# Patient Record
Sex: FEMALE | Race: WHITE | NOT HISPANIC OR LATINO
[De-identification: names, ages, dates, MRNs, and addresses within clinical notes are randomized per-mention and may not be internally consistent; named-entity substitution may affect disease eponyms.]

---

## 2018-06-21 ENCOUNTER — APPOINTMENT (OUTPATIENT)
Dept: OBGYN | Facility: CLINIC | Age: 23
End: 2018-06-21

## 2019-12-08 ENCOUNTER — INPATIENT (INPATIENT)
Facility: HOSPITAL | Age: 24
LOS: 1 days | Discharge: HOME | End: 2019-12-10
Attending: HOSPITALIST | Admitting: HOSPITALIST
Payer: COMMERCIAL

## 2019-12-08 VITALS
SYSTOLIC BLOOD PRESSURE: 133 MMHG | TEMPERATURE: 102 F | DIASTOLIC BLOOD PRESSURE: 62 MMHG | HEART RATE: 111 BPM | OXYGEN SATURATION: 98 % | RESPIRATION RATE: 20 BRPM

## 2019-12-08 LAB
ALBUMIN SERPL ELPH-MCNC: 3.7 G/DL — SIGNIFICANT CHANGE UP (ref 3.5–5.2)
ALP SERPL-CCNC: 53 U/L — SIGNIFICANT CHANGE UP (ref 30–115)
ALT FLD-CCNC: 10 U/L — SIGNIFICANT CHANGE UP (ref 0–41)
ANION GAP SERPL CALC-SCNC: 19 MMOL/L — HIGH (ref 7–14)
APPEARANCE UR: ABNORMAL
APTT BLD: 26.5 SEC — LOW (ref 27–39.2)
AST SERPL-CCNC: 18 U/L — SIGNIFICANT CHANGE UP (ref 0–41)
BACTERIA # UR AUTO: ABNORMAL
BASOPHILS # BLD AUTO: 0.03 K/UL — SIGNIFICANT CHANGE UP (ref 0–0.2)
BASOPHILS NFR BLD AUTO: 0.1 % — SIGNIFICANT CHANGE UP (ref 0–1)
BILIRUB SERPL-MCNC: 0.3 MG/DL — SIGNIFICANT CHANGE UP (ref 0.2–1.2)
BILIRUB UR-MCNC: NEGATIVE — SIGNIFICANT CHANGE UP
BUN SERPL-MCNC: 10 MG/DL — SIGNIFICANT CHANGE UP (ref 10–20)
CALCIUM SERPL-MCNC: 8.9 MG/DL — SIGNIFICANT CHANGE UP (ref 8.5–10.1)
CHLORIDE SERPL-SCNC: 88 MMOL/L — LOW (ref 98–110)
CO2 SERPL-SCNC: 21 MMOL/L — SIGNIFICANT CHANGE UP (ref 17–32)
COLOR SPEC: SIGNIFICANT CHANGE UP
CREAT SERPL-MCNC: 0.9 MG/DL — SIGNIFICANT CHANGE UP (ref 0.7–1.5)
DIFF PNL FLD: ABNORMAL
EOSINOPHIL # BLD AUTO: 0 K/UL — SIGNIFICANT CHANGE UP (ref 0–0.7)
EOSINOPHIL NFR BLD AUTO: 0 % — SIGNIFICANT CHANGE UP (ref 0–8)
EPI CELLS # UR: 3 /HPF — SIGNIFICANT CHANGE UP (ref 0–5)
GLUCOSE SERPL-MCNC: 130 MG/DL — HIGH (ref 70–99)
GLUCOSE UR QL: NEGATIVE — SIGNIFICANT CHANGE UP
HCT VFR BLD CALC: 37.5 % — SIGNIFICANT CHANGE UP (ref 37–47)
HGB BLD-MCNC: 12.9 G/DL — SIGNIFICANT CHANGE UP (ref 12–16)
HYALINE CASTS # UR AUTO: 1 /LPF — SIGNIFICANT CHANGE UP (ref 0–7)
IMM GRANULOCYTES NFR BLD AUTO: 1 % — HIGH (ref 0.1–0.3)
INR BLD: 1.24 RATIO — SIGNIFICANT CHANGE UP (ref 0.65–1.3)
KETONES UR-MCNC: ABNORMAL
LEUKOCYTE ESTERASE UR-ACNC: ABNORMAL
LIDOCAIN IGE QN: 12 U/L — SIGNIFICANT CHANGE UP (ref 7–60)
LYMPHOCYTES # BLD AUTO: 0.87 K/UL — LOW (ref 1.2–3.4)
LYMPHOCYTES # BLD AUTO: 4 % — LOW (ref 20.5–51.1)
MCHC RBC-ENTMCNC: 29.5 PG — SIGNIFICANT CHANGE UP (ref 27–31)
MCHC RBC-ENTMCNC: 34.4 G/DL — SIGNIFICANT CHANGE UP (ref 32–37)
MCV RBC AUTO: 85.6 FL — SIGNIFICANT CHANGE UP (ref 81–99)
MONOCYTES # BLD AUTO: 2.62 K/UL — HIGH (ref 0.1–0.6)
MONOCYTES NFR BLD AUTO: 12 % — HIGH (ref 1.7–9.3)
NEUTROPHILS # BLD AUTO: 18.02 K/UL — HIGH (ref 1.4–6.5)
NEUTROPHILS NFR BLD AUTO: 82.9 % — HIGH (ref 42.2–75.2)
NITRITE UR-MCNC: NEGATIVE — SIGNIFICANT CHANGE UP
NRBC # BLD: 0 /100 WBCS — SIGNIFICANT CHANGE UP (ref 0–0)
PH UR: 6.5 — SIGNIFICANT CHANGE UP (ref 5–8)
PLATELET # BLD AUTO: 236 K/UL — SIGNIFICANT CHANGE UP (ref 130–400)
POTASSIUM SERPL-MCNC: 3.7 MMOL/L — SIGNIFICANT CHANGE UP (ref 3.5–5)
POTASSIUM SERPL-SCNC: 3.7 MMOL/L — SIGNIFICANT CHANGE UP (ref 3.5–5)
PROT SERPL-MCNC: 7.3 G/DL — SIGNIFICANT CHANGE UP (ref 6–8)
PROT UR-MCNC: ABNORMAL
PROTHROM AB SERPL-ACNC: 14.2 SEC — HIGH (ref 9.95–12.87)
RBC # BLD: 4.38 M/UL — SIGNIFICANT CHANGE UP (ref 4.2–5.4)
RBC # FLD: 11.9 % — SIGNIFICANT CHANGE UP (ref 11.5–14.5)
RBC CASTS # UR COMP ASSIST: 3 /HPF — SIGNIFICANT CHANGE UP (ref 0–4)
SODIUM SERPL-SCNC: 128 MMOL/L — LOW (ref 135–146)
SP GR SPEC: 1.01 — LOW (ref 1.01–1.02)
UROBILINOGEN FLD QL: SIGNIFICANT CHANGE UP
WBC # BLD: 21.75 K/UL — HIGH (ref 4.8–10.8)
WBC # FLD AUTO: 21.75 K/UL — HIGH (ref 4.8–10.8)
WBC UR QL: 23 /HPF — HIGH (ref 0–5)

## 2019-12-08 PROCEDURE — 99221 1ST HOSP IP/OBS SF/LOW 40: CPT

## 2019-12-08 PROCEDURE — 71250 CT THORAX DX C-: CPT | Mod: 26

## 2019-12-08 PROCEDURE — 74220 X-RAY XM ESOPHAGUS 1CNTRST: CPT | Mod: 26

## 2019-12-08 PROCEDURE — 74177 CT ABD & PELVIS W/CONTRAST: CPT | Mod: 26

## 2019-12-08 PROCEDURE — 76770 US EXAM ABDO BACK WALL COMP: CPT | Mod: 26

## 2019-12-08 PROCEDURE — 99291 CRITICAL CARE FIRST HOUR: CPT

## 2019-12-08 RX ORDER — SODIUM CHLORIDE 9 MG/ML
1000 INJECTION INTRAMUSCULAR; INTRAVENOUS; SUBCUTANEOUS ONCE
Refills: 0 | Status: COMPLETED | OUTPATIENT
Start: 2019-12-08 | End: 2019-12-08

## 2019-12-08 RX ORDER — PANTOPRAZOLE SODIUM 20 MG/1
40 TABLET, DELAYED RELEASE ORAL
Refills: 0 | Status: DISCONTINUED | OUTPATIENT
Start: 2019-12-08 | End: 2019-12-10

## 2019-12-08 RX ORDER — MEROPENEM 1 G/30ML
1000 INJECTION INTRAVENOUS EVERY 8 HOURS
Refills: 0 | Status: DISCONTINUED | OUTPATIENT
Start: 2019-12-08 | End: 2019-12-10

## 2019-12-08 RX ORDER — SODIUM CHLORIDE 9 MG/ML
1000 INJECTION, SOLUTION INTRAVENOUS
Refills: 0 | Status: DISCONTINUED | OUTPATIENT
Start: 2019-12-08 | End: 2019-12-09

## 2019-12-08 RX ORDER — ONDANSETRON 8 MG/1
4 TABLET, FILM COATED ORAL EVERY 8 HOURS
Refills: 0 | Status: DISCONTINUED | OUTPATIENT
Start: 2019-12-08 | End: 2019-12-09

## 2019-12-08 RX ORDER — ONDANSETRON 8 MG/1
4 TABLET, FILM COATED ORAL ONCE
Refills: 0 | Status: COMPLETED | OUTPATIENT
Start: 2019-12-08 | End: 2019-12-08

## 2019-12-08 RX ORDER — ACETAMINOPHEN 500 MG
650 TABLET ORAL ONCE
Refills: 0 | Status: COMPLETED | OUTPATIENT
Start: 2019-12-08 | End: 2019-12-08

## 2019-12-08 RX ADMIN — SODIUM CHLORIDE 1000 MILLILITER(S): 9 INJECTION INTRAMUSCULAR; INTRAVENOUS; SUBCUTANEOUS at 11:45

## 2019-12-08 RX ADMIN — SODIUM CHLORIDE 1000 MILLILITER(S): 9 INJECTION INTRAMUSCULAR; INTRAVENOUS; SUBCUTANEOUS at 12:44

## 2019-12-08 RX ADMIN — SODIUM CHLORIDE 1000 MILLILITER(S): 9 INJECTION INTRAMUSCULAR; INTRAVENOUS; SUBCUTANEOUS at 13:30

## 2019-12-08 RX ADMIN — ONDANSETRON 4 MILLIGRAM(S): 8 TABLET, FILM COATED ORAL at 21:28

## 2019-12-08 RX ADMIN — ONDANSETRON 4 MILLIGRAM(S): 8 TABLET, FILM COATED ORAL at 11:44

## 2019-12-08 RX ADMIN — MEROPENEM 100 MILLIGRAM(S): 1 INJECTION INTRAVENOUS at 23:14

## 2019-12-08 RX ADMIN — Medication 650 MILLIGRAM(S): at 01:41

## 2019-12-08 RX ADMIN — SODIUM CHLORIDE 100 MILLILITER(S): 9 INJECTION, SOLUTION INTRAVENOUS at 23:15

## 2019-12-08 RX ADMIN — Medication 650 MILLIGRAM(S): at 18:12

## 2019-12-08 RX ADMIN — PANTOPRAZOLE SODIUM 40 MILLIGRAM(S): 20 TABLET, DELAYED RELEASE ORAL at 23:13

## 2019-12-08 RX ADMIN — Medication 650 MILLIGRAM(S): at 12:44

## 2019-12-08 NOTE — H&P ADULT - NSHPPHYSICALEXAM_GEN_ALL_CORE
PHYSICAL EXAM:  GENERAL: NAD, well-developed  HEAD:  Atraumatic, Normocephalic  EYES: EOMI, PERRLA, conjunctiva and sclera clear  NECK: Supple, No JVD  CHEST/LUNG: Clear to auscultation bilaterally; No wheeze  HEART: Regular rate and rhythm; No murmurs, rubs, or gallops  ABDOMEN: Left sided Pyelonephritis   EXTREMITIES:  2+ Peripheral Pulses, No clubbing, cyanosis, or edema  PSYCH: AAOx3  NEUROLOGY: non-focal  SKIN: No rashes or lesions PHYSICAL EXAM:  GENERAL: Left sided abdominal pain  HEAD:  Atraumatic, Normocephalic  EYES: EOMI, PERRLA, conjunctiva and sclera clear  NECK: Supple, No JVD  CHEST/LUNG: b/l decreased air entry  HEART: Regular rate and rhythm; No murmurs, rubs, or gallops  ABDOMEN: Left sided pain  EXTREMITIES:  2+ Peripheral Pulses, No clubbing, cyanosis, or edema  PSYCH: AAOx3  NEUROLOGY: non-focal  SKIN: No rashes or lesions

## 2019-12-08 NOTE — H&P ADULT - ASSESSMENT
24 year old female, no significant PMH , comes in with complaint of abdominal pain, dull, non radiating, located in LLQ, no vaginal bleeding, patient last period was Nov 30th, no recent travel, no hemoptysis. Pt endorses pain along with urinary symptoms , on presentation to ED , pt was meeting sepsis criterion and received 2 litres NS along with Levaquin. Ct scan consistent with Left sided Pyelonephritis along with Pneumomediastinum secondary to Vomiting.    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 Pt endorses pain along with urinary symptoms , on presentation to ED , pt was meeting sepsis criterion and received 2 litres NS along with Levaquin. Ct scan consistent with Left sided Pyelonephritis along with Pneumomediastinum secondary to Vomiting.    #) Sepsis secondary to Lt sided Pyelonephritis  - admit to medicine  - f/u U/C , f/U blood culture, F/U lactate  - IV meropenum for now, de escelate per sensitivities  - IV LR @ 100 , maintain MAP 65 , bolus PRN   - KUB US   - pain and fever control      #) Pneumomediastinum probably secondary to Microperforation   - NPO for now  - IV protonix  - zofran PRN  - Barium swallow negative  - CT surgery f/u  - ICU made aware of pt     #) Diet strict NPO    #) DVT PPX , pt mobile     Dispo Acute

## 2019-12-08 NOTE — H&P ADULT - HISTORY OF PRESENT ILLNESS
24 year old female, no significant PMH , comes in with complaint of abdominal pain, dull, non radiating, located in LLQ, no vaginal bleeding, patient last period was Nov 30th, no recent travel, no hemoptysis. Pt endorses pain along with urinary symptoms , on presentation to ED , pt was meeting sepsis criterion and received 2 litres NS along with Levaquin. Ct scan consistent with Left sided Pyelonephritis along with Pneumomediastinum secondary to Vomiting.

## 2019-12-08 NOTE — ED PROVIDER NOTE - CLINICAL SUMMARY MEDICAL DECISION MAKING FREE TEXT BOX
I have personally performed a history and physical exam on this patient and personally directed the management of the patient. Patient evaluated for abdominal pain, found to have + pyelonephritis and + Pneumodeistinum, patient evaluated by surgery, barium swallow obtained, patient has no leak, admitted to medicine, surgery on board

## 2019-12-08 NOTE — H&P ADULT - NSHPLABSRESULTS_GEN_ALL_CORE
CBC Full  -  ( 08 Dec 2019 10:48 )  WBC Count : 21.75 K/uL  RBC Count : 4.38 M/uL  Hemoglobin : 12.9 g/dL  Hematocrit : 37.5 %  Platelet Count - Automated : 236 K/uL  Mean Cell Volume : 85.6 fL  Mean Cell Hemoglobin : 29.5 pg  Mean Cell Hemoglobin Concentration : 34.4 g/dL  Auto Neutrophil # : 18.02 K/uL  Auto Lymphocyte # : 0.87 K/uL  Auto Monocyte # : 2.62 K/uL  Auto Eosinophil # : 0.00 K/uL  Auto Basophil # : 0.03 K/uL  Auto Neutrophil % : 82.9 %  Auto Lymphocyte % : 4.0 %  Auto Monocyte % : 12.0 %  Auto Eosinophil % : 0.0 %  Auto Basophil % : 0.1 %    12-08    128<L>  |  88<L>  |  10  ----------------------------<  130<H>  3.7   |  21  |  0.9    Ca    8.9      08 Dec 2019 10:48    TPro  7.3  /  Alb  3.7  /  TBili  0.3  /  DBili  x   /  AST  18  /  ALT  10  /  AlkPhos  53  12-08    CT abdomen     1. Findings are compatible with acute left-sided pyelonephritis.    2. Partially imaged pneumomediastinum.

## 2019-12-08 NOTE — CONSULT NOTE ADULT - SUBJECTIVE AND OBJECTIVE BOX
RICHI CHIANG [id number]    HPI: 25 y/o female diagnosed with pyelonephritis c/o fever and vomiting for a few days found to have pneumomediastinum on ct scan.       PAST MEDICAL & SURGICAL HISTORY:  No pertinent past medical history  No significant past surgical history        MEDICATIONS  (STANDING):    MEDICATIONS  (PRN): inhaler (uses it sometimes / has it saved from her bronchitis last year )      Allergies    No Known Allergies    Intolerances        REVIEW OF SYSTEMS    [x ] A ten-point review of systems was otherwise negative except as noted.  [ ] Due to altered mental status/intubation, subjective information were not able to be obtained from the patient. History was obtained, to the extent possible, from review of the chart and collateral sources of information.      Vital Signs Last 24 Hrs  T(C): 38.3 (08 Dec 2019 15:57), Max: 39.3 (08 Dec 2019 13:35)  T(F): 100.9 (08 Dec 2019 15:57), Max: 102.7 (08 Dec 2019 13:35)  HR: 109 (08 Dec 2019 15:57) (98 - 111)  BP: 110/56 (08 Dec 2019 15:57) (108/52 - 133/62)  BP(mean): --  RR: 16 (08 Dec 2019 15:57) (16 - 20)  SpO2: 99% (08 Dec 2019 15:57) (98% - 99%)    PHYSICAL EXAM:  GENERAL: NAD, well-appearing  CHEST/LUNG: Clear to auscultation bilaterally  HEART: Regular rate and rhythm  ABDOMEN: Soft, Nontender, Nondistended;   EXTREMITIES:  No clubbing, cyanosis, or edema        Labs:  CAPILLARY BLOOD GLUCOSE                              12.9   21.75 )-----------( 236      ( 08 Dec 2019 10:48 )             37.5       Auto Neutrophil %: 82.9 % (19 @ 10:48)  Auto Immature Granulocyte %: 1.0 % (19 @ 10:48)        128<L>  |  88<L>  |  10  ----------------------------<  130<H>  3.7   |  21  |  0.9      Calcium, Total Serum: 8.9 mg/dL (19 @ 10:48)      LFTs:             7.3  | 0.3  | 18       ------------------[53      ( 08 Dec 2019 10:48 )  3.7  | x    | 10          Lipase:12     Amylase:x             Coags:     14.20  ----< 1.24    ( 08 Dec 2019 10:48 )     26.5                Urinalysis Basic - ( 08 Dec 2019 11:10 )    Color: Light Yellow / Appearance: Slightly Turbid / S.009 / pH: x  Gluc: x / Ketone: Small  / Bili: Negative / Urobili: <2 mg/dL   Blood: x / Protein: 30 mg/dL / Nitrite: Negative   Leuk Esterase: Moderate / RBC: 3 /HPF / WBC 23 /HPF   Sq Epi: x / Non Sq Epi: 3 /HPF / Bacteria: Few            RADIOLOGY & ADDITIONAL STUDIES:  < from: CT Chest No Cont (19 @ 14:17) >  Extensive pneumomediastinum as detailed above. No pneumothorax.  < from: CT Abdomen and Pelvis w/ IV Cont (19 @ 13:09) >  1. Findings are compatible with acute left-sided pyelonephritis.    2. Partially imaged pneumomediastinum.    < end of copied text > RICHI CHIANG [id number]    HPI: 23 y/o female diagnosed with pyelonephritis c/o fever and vomiting for a few days found to have pneumomediastinum on ct scan.       PAST MEDICAL & SURGICAL HISTORY:  No pertinent past medical history  No significant past surgical history        MEDICATIONS  (STANDING):    MEDICATIONS  (PRN): inhaler (uses it sometimes / has it saved from her bronchitis last year )      Allergies    No Known Allergies    Intolerances        REVIEW OF SYSTEMS    [x ] A ten-point review of systems was otherwise negative except as noted.  [ ] Due to altered mental status/intubation, subjective information were not able to be obtained from the patient. History was obtained, to the extent possible, from review of the chart and collateral sources of information.      Vital Signs Last 24 Hrs  T(C): 38.3 (08 Dec 2019 15:57), Max: 39.3 (08 Dec 2019 13:35)  T(F): 100.9 (08 Dec 2019 15:57), Max: 102.7 (08 Dec 2019 13:35)  HR: 109 (08 Dec 2019 15:57) (98 - 111)  BP: 110/56 (08 Dec 2019 15:57) (108/52 - 133/62)  BP(mean): --  RR: 16 (08 Dec 2019 15:57) (16 - 20)  SpO2: 99% (08 Dec 2019 15:57) (98% - 99%)    PHYSICAL EXAM:  GENERAL: NAD, well-appearing  CHEST/LUNG: Clear to auscultation bilaterally  HEART: Regular rate and rhythm  ABDOMEN: Soft, Nontender, Nondistended;   EXTREMITIES:  No clubbing, cyanosis, or edema        Labs:  CAPILLARY BLOOD GLUCOSE                              12.9   21.75 )-----------( 236      ( 08 Dec 2019 10:48 )             37.5       Auto Neutrophil %: 82.9 % (19 @ 10:48)  Auto Immature Granulocyte %: 1.0 % (19 @ 10:48)        128<L>  |  88<L>  |  10  ----------------------------<  130<H>  3.7   |  21  |  0.9      Calcium, Total Serum: 8.9 mg/dL (19 @ 10:48)      LFTs:             7.3  | 0.3  | 18       ------------------[53      ( 08 Dec 2019 10:48 )  3.7  | x    | 10          Lipase:12     Amylase:x             Coags:     14.20  ----< 1.24    ( 08 Dec 2019 10:48 )     26.5                Urinalysis Basic - ( 08 Dec 2019 11:10 )    Color: Light Yellow / Appearance: Slightly Turbid / S.009 / pH: x  Gluc: x / Ketone: Small  / Bili: Negative / Urobili: <2 mg/dL   Blood: x / Protein: 30 mg/dL / Nitrite: Negative   Leuk Esterase: Moderate / RBC: 3 /HPF / WBC 23 /HPF   Sq Epi: x / Non Sq Epi: 3 /HPF / Bacteria: Few            RADIOLOGY & ADDITIONAL STUDIES:  < from: CT Chest No Cont (19 @ 14:17) >  Extensive pneumomediastinum as detailed above. No pneumothorax.  < from: CT Abdomen and Pelvis w/ IV Cont (19 @ 13:09) >  1. Findings are compatible with acute left-sided pyelonephritis.    2. Partially imaged pneumomediastinum.    < end of copied text >    < from: Xray Esophagram (19 @ 17:03) >  Impression  No evidence of esophageal leak.    < end of copied text >

## 2019-12-08 NOTE — ED PROVIDER NOTE - PHYSICAL EXAMINATION
CONSTITUTIONAL: Well-developed; well-nourished; in no acute distress. Sitting up and providing appropriate history and physical examination  SKIN: skin exam is warm and dry, no acute rash.  HEAD: Normocephalic; atraumatic.  EYES: PERRL, 3 mm bilateral, no nystagmus, EOM intact; conjunctiva and sclera clear.  ENT: + DRY MM, No nasal discharge; airway clear.  NECK: Supple; non tender. + full passive ROM in all directions. No JVD  CARD: S1, S2 normal; no murmurs, gallops, or rubs. Regular rate and rhythm. + Symmetric Strong Pulses  RESP: No wheezes, rales or rhonchi. Good air movement bilaterally  ABD: soft; non-distended; + LLQ tender. No Rebound, No Guarding, No signs of peritonitis, No CVA tenderness. No pulsatile abdominal mass. + Strong and Symmetric Pulses  EXT: Normal ROM. No clubbing, cyanosis or edema. Dp and Pt Pulses intact. Cap refill less than 3 seconds  NEURO: CN 2-12 intact, normal finger to nose, normal romberg, stable gait, no sensory or motor deficits, Alert, oriented, grossly unremarkable. No Focal deficits. GCS 15. NIH 0  PSYCH: Cooperative, appropriate.

## 2019-12-08 NOTE — ED ADULT NURSE REASSESSMENT NOTE - NS ED NURSE REASSESS COMMENT FT1
received pt from previous RN; pt aox4, pt endorses she feels nausea given prn Zofran and ivf as per order. vss.  will continue to monitor/ assess

## 2019-12-08 NOTE — ED PROVIDER NOTE - OBJECTIVE STATEMENT
24 year old female, no sig pmhx, comes in with complaint of abdominal pain, dull, non radiating, located in LLQ, no vaginal bleeding, patient last period Nov 30th, no recent travel, no hemoptysis, + multiple episode of nb nb vomiting

## 2019-12-08 NOTE — H&P ADULT - ATTENDING COMMENTS
Patient seen and examined independently. I agree with the resident's note, physical exam, and plan except as below.  Vital Signs Last 24 Hrs  T(C): 37.9 (09 Dec 2019 07:39), Max: 39.3 (08 Dec 2019 13:35)  T(F): 100.3 (09 Dec 2019 07:39), Max: 102.7 (08 Dec 2019 13:35)  HR: 87 (09 Dec 2019 07:39) (87 - 109)  BP: 115/85 (09 Dec 2019 07:39) (106/57 - 125/55)  BP(mean): --  RR: 18 (09 Dec 2019 07:39) (16 - 18)  SpO2: 98% (09 Dec 2019 07:39) (98% - 99%)  PE  nad  aaox3  ctabl  [id number]  soft ntnd+bs  no cvat  no cce  ambulating well     #sepsis due to pyleo - started empricially on gasper - follow ucx, bcx, - cont IVF  #pneumomediastinum - possibly due to wretching - esophageal study shows no leak - being followed by CTS  no intervention at this time - can advance to clears diet    follow daily CXR, antiemetics

## 2019-12-08 NOTE — CONSULT NOTE ADULT - ASSESSMENT
23 y/o female with pneumomediastinum likely secondary to vomiting   - f/u barium swallow 25 y/o female with pneumomediastinum likely secondary to vomiting   - f/u barium swallow       Senior Resident Note  25 YO presents with multiple episodes of vomiting and lower abd discomfort, pt denies any chest pain, SOB or any other symptoms. CT Chest/Abd/pel show acute LT pyelonephritis and extensive pneumomediastinum. barium swallow performed shows no leak. WBC 21  Pt seen and examined, abd soft, NT, ND  NPO and IVF for now, will continue to follow  Above note has been reviewed and edited  Plan d/w patient and Dr. Herrera

## 2019-12-09 LAB
ANION GAP SERPL CALC-SCNC: 17 MMOL/L — HIGH (ref 7–14)
BUN SERPL-MCNC: 7 MG/DL — LOW (ref 10–20)
CALCIUM SERPL-MCNC: 8 MG/DL — LOW (ref 8.5–10.1)
CHLORIDE SERPL-SCNC: 98 MMOL/L — SIGNIFICANT CHANGE UP (ref 98–110)
CO2 SERPL-SCNC: 19 MMOL/L — SIGNIFICANT CHANGE UP (ref 17–32)
CREAT SERPL-MCNC: 0.7 MG/DL — SIGNIFICANT CHANGE UP (ref 0.7–1.5)
GLUCOSE SERPL-MCNC: 110 MG/DL — HIGH (ref 70–99)
HCT VFR BLD CALC: 32.9 % — LOW (ref 37–47)
HGB BLD-MCNC: 11.3 G/DL — LOW (ref 12–16)
MCHC RBC-ENTMCNC: 29.3 PG — SIGNIFICANT CHANGE UP (ref 27–31)
MCHC RBC-ENTMCNC: 34.3 G/DL — SIGNIFICANT CHANGE UP (ref 32–37)
MCV RBC AUTO: 85.2 FL — SIGNIFICANT CHANGE UP (ref 81–99)
NRBC # BLD: 0 /100 WBCS — SIGNIFICANT CHANGE UP (ref 0–0)
PLATELET # BLD AUTO: 233 K/UL — SIGNIFICANT CHANGE UP (ref 130–400)
POTASSIUM SERPL-MCNC: 3.1 MMOL/L — LOW (ref 3.5–5)
POTASSIUM SERPL-SCNC: 3.1 MMOL/L — LOW (ref 3.5–5)
RBC # BLD: 3.86 M/UL — LOW (ref 4.2–5.4)
RBC # FLD: 12.3 % — SIGNIFICANT CHANGE UP (ref 11.5–14.5)
SODIUM SERPL-SCNC: 134 MMOL/L — LOW (ref 135–146)
WBC # BLD: 16.78 K/UL — HIGH (ref 4.8–10.8)
WBC # FLD AUTO: 16.78 K/UL — HIGH (ref 4.8–10.8)

## 2019-12-09 PROCEDURE — 99232 SBSQ HOSP IP/OBS MODERATE 35: CPT

## 2019-12-09 PROCEDURE — 71046 X-RAY EXAM CHEST 2 VIEWS: CPT | Mod: 26

## 2019-12-09 PROCEDURE — 74018 RADEX ABDOMEN 1 VIEW: CPT | Mod: 26

## 2019-12-09 PROCEDURE — 99223 1ST HOSP IP/OBS HIGH 75: CPT | Mod: AI

## 2019-12-09 RX ORDER — ONDANSETRON 8 MG/1
4 TABLET, FILM COATED ORAL ONCE
Refills: 0 | Status: COMPLETED | OUTPATIENT
Start: 2019-12-09 | End: 2019-12-09

## 2019-12-09 RX ORDER — INFLUENZA VIRUS VACCINE 15; 15; 15; 15 UG/.5ML; UG/.5ML; UG/.5ML; UG/.5ML
0.5 SUSPENSION INTRAMUSCULAR ONCE
Refills: 0 | Status: COMPLETED | OUTPATIENT
Start: 2019-12-09 | End: 2019-12-10

## 2019-12-09 RX ORDER — LANOLIN ALCOHOL/MO/W.PET/CERES
5 CREAM (GRAM) TOPICAL AT BEDTIME
Refills: 0 | Status: DISCONTINUED | OUTPATIENT
Start: 2019-12-09 | End: 2019-12-10

## 2019-12-09 RX ORDER — POTASSIUM CHLORIDE 20 MEQ
20 PACKET (EA) ORAL
Refills: 0 | Status: COMPLETED | OUTPATIENT
Start: 2019-12-09 | End: 2019-12-09

## 2019-12-09 RX ORDER — ONDANSETRON 8 MG/1
4 TABLET, FILM COATED ORAL EVERY 6 HOURS
Refills: 0 | Status: DISCONTINUED | OUTPATIENT
Start: 2019-12-09 | End: 2019-12-10

## 2019-12-09 RX ADMIN — PANTOPRAZOLE SODIUM 40 MILLIGRAM(S): 20 TABLET, DELAYED RELEASE ORAL at 07:07

## 2019-12-09 RX ADMIN — ONDANSETRON 4 MILLIGRAM(S): 8 TABLET, FILM COATED ORAL at 02:00

## 2019-12-09 RX ADMIN — ONDANSETRON 4 MILLIGRAM(S): 8 TABLET, FILM COATED ORAL at 20:02

## 2019-12-09 RX ADMIN — Medication 5 MILLIGRAM(S): at 21:30

## 2019-12-09 RX ADMIN — MEROPENEM 100 MILLIGRAM(S): 1 INJECTION INTRAVENOUS at 21:29

## 2019-12-09 RX ADMIN — PANTOPRAZOLE SODIUM 40 MILLIGRAM(S): 20 TABLET, DELAYED RELEASE ORAL at 17:49

## 2019-12-09 RX ADMIN — ONDANSETRON 4 MILLIGRAM(S): 8 TABLET, FILM COATED ORAL at 21:35

## 2019-12-09 RX ADMIN — Medication 50 MILLIEQUIVALENT(S): at 13:46

## 2019-12-09 RX ADMIN — ONDANSETRON 4 MILLIGRAM(S): 8 TABLET, FILM COATED ORAL at 13:51

## 2019-12-09 RX ADMIN — ONDANSETRON 4 MILLIGRAM(S): 8 TABLET, FILM COATED ORAL at 08:31

## 2019-12-09 RX ADMIN — Medication 50 MILLIEQUIVALENT(S): at 12:24

## 2019-12-09 RX ADMIN — MEROPENEM 100 MILLIGRAM(S): 1 INJECTION INTRAVENOUS at 07:27

## 2019-12-09 RX ADMIN — MEROPENEM 100 MILLIGRAM(S): 1 INJECTION INTRAVENOUS at 13:46

## 2019-12-09 NOTE — PROGRESS NOTE ADULT - ASSESSMENT
Ms. Murray is a 24 year old female, no significant PMH , comes in with complaint of abdominal pain, dull, non radiating, located in LLQ.    #) Sepsis secondary to Left sided Pyelonephritis  - First urinary tract infection as per patient, no previous ab use  - f/u U/C , f/U blood culture  - IV meropenum for now (as also possible esophageal perforation)  - Kidney US: no evidence of hydronephrosis  - CT scan showing radiologic evidence of left sided pyelonephritis  - pain and fever control    #) Pneumomediastinum probably secondary to Microperforation   - CT surgery following.   - IV protonix  - zofran PRN  - Water soluble swallow test: negative  - Clear liquid diet  - Repeat Chest xray     #) Hypokalemia  - Potassium repletion    #) Hypovolemic Hyponatremia  - secondary to profuse vomiting  - Improving

## 2019-12-09 NOTE — CONSULT NOTE ADULT - SUBJECTIVE AND OBJECTIVE BOX
Patient is a 24y old  Female who presents with a chief complaint of suspected pyelonephritis and Pneumomediastinum (09 Dec 2019 11:07)      HPI:  24 year old female, no significant PMH , comes in with complaint of abdominal pain, dull, non radiating, located in LLQ, no vaginal bleeding, patient last period was , no recent travel, no hemoptysis. Pt endorses dysuria, increased urinary frequency but denies hematuria .,Reports NBNB vomiting x 4 days , progressively worsening. unable to eat anything. Denies hemoptysis .No h/o recent trauma or fall  Reports OCP use . Denies marijuana, cocaine, IVDU . Denies vaping   In ED patient received litres NS along with IV  Levaquin.   Ct scan consistent with Left sided Pyelonephritis .Also seen Pneumomediastinum .ICU and CT surgery consulted overnight for evaluation of pneumomediastinum        PAST MEDICAL & SURGICAL HISTORY:  No pertinent past medical history  No significant past surgical history      SOCIAL HX:   Smoking    denies                     ETOH       denies                     Other denies IVDU or Marijuana use    FAMILY HISTORY:  :  No known cardiovacular family hisotry     Review Of Systems:     CONSTITUTIONAL:   no fever   no chills.  no weight gain   no weight loss    EYES:   no discharge,   no pain  no redness,   no visual changes.    ENT:   Ears: no ear pain and no hearing problems.  Nose: no nasal congestion and no nasal drainage.  Mouth/Throat: no dysphagia,  no hoarseness and no throat pain.  Neck: no lumps, no pain, no stiffness and no swollen glands.     CARDIOVASCULAR:   no chest pain,   no swelling  no palpitations  no syncope    RESPIRATORY:  no SOB,  no wheezing ,  no respiratory difficulty  no sputum production    GASTROINTESTINAL:   + abdominal pain,   no constipation,   no diarrhea,   + vomiting.    GENITOURINARY:  + dysuria,   + frequency,   + urgency  no hematuria.    MUSCULOSKELETAL:   no back pain,   no musculoskeletal pain,  no weakness.    SKIN:   no jaundice,   no lesions,   no pruritis,   no rashes.    NEURO:   no loss of consciousness,   no gait abnormality,   no headache,   no sensory deficits,   no weakness.    PSYCHIATRIC:   no known mental health issues  no anxiety  no depression    ALLERGIC/IMMUNOLOGIC:   No active allergic or immunologic issues      Allergies    No Known Allergies    Intolerances          PHYSICAL EXAM    ICU Vital Signs Last 24 Hrs  T(C): 37.9 (09 Dec 2019 07:39), Max: 39.3 (08 Dec 2019 13:35)  T(F): 100.3 (09 Dec 2019 07:39), Max: 102.7 (08 Dec 2019 13:35)  HR: 87 (09 Dec 2019 07:39) (87 - 109)  BP: 115/85 (09 Dec 2019 07:39) (106/57 - 125/55)  BP(mean): --  ABP: --  ABP(mean): --  RR: 18 (09 Dec 2019 07:39) (16 - 18)  SpO2: 98% (09 Dec 2019 07:39) (98% - 99%)      CONSTITUTIONAL:   Ill appearing.  Well nourished.  NAD    ENT:   Airway patent,   Mouth with normal mucosa.   No thrush    EYES:   pupils equal,   round and reactive to light.    CARDIAC:   Normal rate,   Regular rhythm.    Heart sounds S1, S2.   No edema      Vascular:   normal systolic impulse  no bruits    RESPIRATORY:   b/l BS  No wheezing   Normal chest expansion  No use of accessory muscles    GASTROINTESTINAL:  Abdomen soft   Non-tender,   No guarding,   + BS    GENITOURINARY  normal genitalia for sex  no edema    MUSCULOSKELETAL:   Range of motion is not limited,  no clubbing, cyanosis    NEUROLOGICAL:   Alert and oriented   No motor or sensory deficits.  Pertinent DTRs normal    SKIN:   Skin normal color for race,   Warm and dry  No evidence of rash.    PSYCHIATRIC:   Normal mood and affect.   No apparent risk to self or others.    HEME LYMPH:   No splenomegaly.  No cervical  lymphadenopathy.  No inguinal lymphadenopathy              LABS:                          11.3   16.78 )-----------( 233      ( 09 Dec 2019 05:21 )             32.9                                               12-    134<L>  |  98  |  7<L>  ----------------------------<  110<H>  3.1<L>   |  19  |  0.7    Ca    8.0<L>      09 Dec 2019 05:21    TPro  7.3  /  Alb  3.7  /  TBili  0.3  /  DBili  x   /  AST  18  /  ALT  10  /  AlkPhos  53  12-08      PT/INR - ( 08 Dec 2019 10:48 )   PT: 14.20 sec;   INR: 1.24 ratio         PTT - ( 08 Dec 2019 10:48 )  PTT:26.5 sec                                       Urinalysis Basic - ( 08 Dec 2019 11:10 )    Color: Light Yellow / Appearance: Slightly Turbid / S.009 / pH: x  Gluc: x / Ketone: Small  / Bili: Negative / Urobili: <2 mg/dL   Blood: x / Protein: 30 mg/dL / Nitrite: Negative   Leuk Esterase: Moderate / RBC: 3 /HPF / WBC 23 /HPF   Sq Epi: x / Non Sq Epi: 3 /HPF / Bacteria: Few                                                  LIVER FUNCTIONS - ( 08 Dec 2019 10:48 )  Alb: 3.7 g/dL / Pro: 7.3 g/dL / ALK PHOS: 53 U/L / ALT: 10 U/L / AST: 18 U/L / GGT: x                                                                                                                                       X-Rays reviewed:             < from: Xray Chest 2 Views PA/Lat (19 @ 09:42) >    No radiographic evidence for pneumomediastinum.    No pneumothorax.      < end of copied text >                                                              < from: Xray Esophagram (19 @ 17:03) >  This study was performed with water-soluble contrast given by mouth under   fluoroscopic monitoring. X-raytechnician Ally was observed in the exam   and radiology resident ezequiel was assisting the exam.  There is no impediment to the flow of contrast through the esophagus into   the stomach.  No contrast extravasation is seen.  Impression  No evidence of esophageal leak.    < end of copied text >    < from: CT Abdomen and Pelvis w/ IV Cont (19 @ 13:09) >  IMPRESSION:     1. Findings are compatible with acute left-sided pyelonephritis.    2. Partially imaged pneumomediastinum.    < end of copied text >      MEDICATIONS  (STANDING):  meropenem  IVPB 1000 milliGRAM(s) IV Intermittent every 8 hours  pantoprazole  Injectable 40 milliGRAM(s) IV Push two times a day  potassium chloride  20 mEq/100 mL IVPB 20 milliEquivalent(s) IV Intermittent every 2 hours    MEDICATIONS  (PRN):  ondansetron Injectable 4 milliGRAM(s) IV Push every 8 hours PRN Nausea and/or Vomiting Patient is a 24y old  Female who presents with a chief complaint of suspected pyelonephritis and Pneumomediastinum (09 Dec 2019 11:07)      HPI:  24 year old female, no significant PMH , comes in with complaint of abdominal pain, dull, non radiating, located in LLQ, no vaginal bleeding, patient last period was , no recent travel, no hemoptysis. Pt endorses dysuria, increased urinary frequency but denies hematuria .,Reports NBNB vomiting x 4 days , progressively worsening. unable to eat anything. Denies hemoptysis .No h/o recent trauma or fall  Reports OCP use . Denies marijuana, cocaine, IVDU . Denies vaping   In ED patient received litres NS along with IV  Levaquin.   Ct scan consistent with Left sided Pyelonephritis .Also seen Pneumomediastinum .ICU and CT surgery consulted overnight for evaluation of pneumomediastinum, denies CP, dysphagia.        PAST MEDICAL & SURGICAL HISTORY:  No pertinent past medical history  No significant past surgical history      SOCIAL HX:   Smoking    denies                     ETOH       denies                     Other denies IVDU or Marijuana use    FAMILY HISTORY:  :  No known cardiovacular family hisotry     Review Of Systems:     CONSTITUTIONAL:   no fever   no chills.  no weight gain   no weight loss    EYES:   no discharge,   no pain  no redness,   no visual changes.    ENT:   Ears: no ear pain and no hearing problems.  Nose: no nasal congestion and no nasal drainage.  Mouth/Throat: no dysphagia,  no hoarseness and no throat pain.  Neck: no lumps, no pain, no stiffness and no swollen glands.     CARDIOVASCULAR:   no chest pain,   no swelling  no palpitations  no syncope    RESPIRATORY:  no SOB,  no wheezing ,  no respiratory difficulty  no sputum production    GASTROINTESTINAL:   + abdominal pain,   no constipation,   no diarrhea,   + vomiting.    GENITOURINARY:  + dysuria,   + frequency,   + urgency  no hematuria.    MUSCULOSKELETAL:   no back pain,   no musculoskeletal pain,  no weakness.    SKIN:   no jaundice,   no lesions,   no pruritis,   no rashes.    NEURO:   no loss of consciousness,   no gait abnormality,   no headache,   no sensory deficits,   no weakness.    PSYCHIATRIC:   no known mental health issues  no anxiety  no depression    ALLERGIC/IMMUNOLOGIC:   No active allergic or immunologic issues      Allergies    No Known Allergies    Intolerances          PHYSICAL EXAM    ICU Vital Signs Last 24 Hrs  T(C): 37.9 (09 Dec 2019 07:39), Max: 39.3 (08 Dec 2019 13:35)  T(F): 100.3 (09 Dec 2019 07:39), Max: 102.7 (08 Dec 2019 13:35)  HR: 87 (09 Dec 2019 07:39) (87 - 109)  BP: 115/85 (09 Dec 2019 07:39) (106/57 - 125/55)  RR: 18 (09 Dec 2019 07:39) (16 - 18)  SpO2: 98% (09 Dec 2019 07:39) (98% - 99%)      CONSTITUTIONAL:   Ill appearing.  Well nourished.  NAD, no suq emphysema    ENT:   Airway patent,   Mouth with normal mucosa.   No thrush    EYES:   pupils equal,   round and reactive to light.    CARDIAC:   Normal rate,   Regular rhythm.    Heart sounds S1, S2.   No edema      Vascular:   normal systolic impulse  no bruits    RESPIRATORY:   b/l BS  No wheezing   Normal chest expansion  No use of accessory muscles    GASTROINTESTINAL:  Abdomen soft   L CVA tenderness      MUSCULOSKELETAL:   Range of motion is not limited,  no clubbing, cyanosis    NEUROLOGICAL:   Alert and oriented   No motor or sensory deficits.  Pertinent DTRs normal    SKIN:   Skin normal color for race,   Warm and dry  No evidence of rash.    PSYCHIATRIC:   Normal mood and affect.   No apparent risk to self or others.    HEME LYMPH:   No splenomegaly.  No cervical  lymphadenopathy.  No inguinal lymphadenopathy              LABS:                          11.3   16.78 )-----------( 233      ( 09 Dec 2019 05:21 )             32.9                                               12-    134<L>  |  98  |  7<L>  ----------------------------<  110<H>  3.1<L>   |  19  |  0.7    Ca    8.0<L>      09 Dec 2019 05:21    TPro  7.3  /  Alb  3.7  /  TBili  0.3  /  DBili  x   /  AST  18  /  ALT  10  /  AlkPhos  53  12-08      PT/INR - ( 08 Dec 2019 10:48 )   PT: 14.20 sec;   INR: 1.24 ratio         PTT - ( 08 Dec 2019 10:48 )  PTT:26.5 sec                                       Urinalysis Basic - ( 08 Dec 2019 11:10 )    Color: Light Yellow / Appearance: Slightly Turbid / S.009 / pH: x  Gluc: x / Ketone: Small  / Bili: Negative / Urobili: <2 mg/dL   Blood: x / Protein: 30 mg/dL / Nitrite: Negative   Leuk Esterase: Moderate / RBC: 3 /HPF / WBC 23 /HPF   Sq Epi: x / Non Sq Epi: 3 /HPF / Bacteria: Few                                                  LIVER FUNCTIONS - ( 08 Dec 2019 10:48 )  Alb: 3.7 g/dL / Pro: 7.3 g/dL / ALK PHOS: 53 U/L / ALT: 10 U/L / AST: 18 U/L / GGT: x                                                                                                                                       X-Rays reviewed:             < from: Xray Chest 2 Views PA/Lat (19 @ 09:42) >    No radiographic evidence for pneumomediastinum.    No pneumothorax.      < end of copied text >                                                              < from: Xray Esophagram (19 @ 17:03) >  This study was performed with water-soluble contrast given by mouth under   fluoroscopic monitoring. X-raytechnician Ally was observed in the exam   and radiology resident ezequiel was assisting the exam.  There is no impediment to the flow of contrast through the esophagus into   the stomach.  No contrast extravasation is seen.  Impression  No evidence of esophageal leak.    < end of copied text >    < from: CT Abdomen and Pelvis w/ IV Cont (19 @ 13:09) >  IMPRESSION:     1. Findings are compatible with acute left-sided pyelonephritis.    2. Partially imaged pneumomediastinum.    < end of copied text >      MEDICATIONS  (STANDING):  meropenem  IVPB 1000 milliGRAM(s) IV Intermittent every 8 hours  pantoprazole  Injectable 40 milliGRAM(s) IV Push two times a day  potassium chloride  20 mEq/100 mL IVPB 20 milliEquivalent(s) IV Intermittent every 2 hours    MEDICATIONS  (PRN):  ondansetron Injectable 4 milliGRAM(s) IV Push every 8 hours PRN Nausea and/or Vomiting

## 2019-12-09 NOTE — CONSULT NOTE ADULT - ASSESSMENT
IMPRESSION  Pneumomediastinum 2/2 vomiting;S/P Repeat CXR - resolved pneumomediastinum  S/P Esophagram - no evidence of esophageal leak  Left Pyelonephritis- No hydronephrosis    PLAN    CNS:No sedation or CNS depressants. Urine and Serum drug screen    HEENT: Oral care    PULMONARY:  HOB @ 45 degrees. CT surgery recs    CARDIOVASCULAR: Keep I=0 . D/C IVF. Cardiac enzyme x 1 . Lactate level    GI: GI prophylaxis. Advance  Feeding as tolerated     RENAL:  Follow up lytes.  Correct as needed     INFECTIOUS DISEASE: Meropenem . Follow up cultures    HEMATOLOGICAL:  DVT prophylaxis.    ENDOCRINE:  Follow up FS.  Insulin protocol if needed.    MUSCULOSKELETAL:OOB to chair    Patient does not require MICU monitoring for now. Recall if any change in status IMPRESSION  Pneumomediastinum 2/2 vomiting;  S/P Esophagram  Left Pyelonephritis- No hydronephrosis    PLAN    CNS:No sedation or CNS depressants.     HEENT: Oral care    PULMONARY:  HOB @ 45 degrees.     CARDIOVASCULAR: IVF    GI: GI prophylaxis. Advance  Feeding as tolerated     RENAL:  Follow up lytes.  Correct as needed     INFECTIOUS DISEASE: ABX, F/UP CX    HEMATOLOGICAL:  DVT prophylaxis.    ENDOCRINE:  Follow up FS.  Insulin protocol if needed.    MUSCULOSKELETAL: OOB to chair    PUL STANDPOINT OP F/UP

## 2019-12-09 NOTE — PROGRESS NOTE ADULT - ASSESSMENT
23 YO presents with multiple episodes of vomiting and lower abd discomfort, pt denies any chest pain, SOB or any other symptoms. CT Chest/Abd/pel show acute LT pyelonephritis and extensive pneumomediastinum. barium swallow performed shows no leak. WBC 21  Pt seen and examined, abd soft, NT, ND    PLAN:  F/U CXR  Diet: Clears as tolerated  Consider urology consult for hydronephrosis/pyelo

## 2019-12-09 NOTE — PROGRESS NOTE ADULT - SUBJECTIVE AND OBJECTIVE BOX
SUBJECTIVE:    Patient is a 24y old Female who presents with a chief complaint of suspected pyelonephritis and Pneumomediastinum (09 Dec 2019 07:29)    Currently admitted to medicine with the primary diagnosis of Pyelonephritis     Today is hospital day 1d. This morning she is resting comfortably in bed and reports no new issues or overnight events. Noted that her nausea/vomiting has resolved. Her abdominal pain is better.  NO SOB, cough, or chest pain. Asking for food.     PAST MEDICAL & SURGICAL HISTORY  No pertinent past medical history  No significant past surgical history    SOCIAL HISTORY:  Negative for smoking/alcohol/drug use.     ALLERGIES:  No Known Allergies    MEDICATIONS:  STANDING MEDICATIONS  lactated ringers. 1000 milliLiter(s) IV Continuous <Continuous>  meropenem  IVPB 1000 milliGRAM(s) IV Intermittent every 8 hours  pantoprazole  Injectable 40 milliGRAM(s) IV Push two times a day  potassium chloride  20 mEq/100 mL IVPB 20 milliEquivalent(s) IV Intermittent every 2 hours    PRN MEDICATIONS  ondansetron Injectable 4 milliGRAM(s) IV Push every 8 hours PRN    VITALS:   T(F): 100.3  HR: 87  BP: 115/85  RR: 18  SpO2: 98%    LABS:                        11.3   16.78 )-----------( 233      ( 09 Dec 2019 05:21 )             32.9     12-    134<L>  |  98  |  7<L>  ----------------------------<  110<H>  3.1<L>   |  19  |  0.7    Ca    8.0<L>      09 Dec 2019 05:21    TPro  7.3  /  Alb  3.7  /  TBili  0.3  /  DBili  x   /  AST  18  /  ALT  10  /  AlkPhos  53  12-08    PT/INR - ( 08 Dec 2019 10:48 )   PT: 14.20 sec;   INR: 1.24 ratio         PTT - ( 08 Dec 2019 10:48 )  PTT:26.5 sec  Urinalysis Basic - ( 08 Dec 2019 11:10 )    Color: Light Yellow / Appearance: Slightly Turbid / S.009 / pH: x  Gluc: x / Ketone: Small  / Bili: Negative / Urobili: <2 mg/dL   Blood: x / Protein: 30 mg/dL / Nitrite: Negative   Leuk Esterase: Moderate / RBC: 3 /HPF / WBC 23 /HPF   Sq Epi: x / Non Sq Epi: 3 /HPF / Bacteria: Few    RADIOLOGY:  < from: CT Chest No Cont (19 @ 14:17) >  IMPRESSION:  Extensive pneumomediastinum as detailed above. No pneumothorax.    < end of copied text >      < from: US Kidney and Bladder (19 @ 21:10) >  IMPRESSION:  Normal renal and bladder ultrasound.    < end of copied text >      PHYSICAL EXAM:  	GENERAL: AAO*3, not in distress, on room air  	HEAD:  Atraumatic, Normocephalic  	EYES: EOMI, conjunctiva and sclera clear  	NECK: Supple,  	CHEST/LUNG: b/l decreased air entry  	HEART: Regular rate and rhythm  	ABDOMEN: Soft, no tenderness, no CVA tenderness  	EXTREMITIES:  2+ Peripheral Pulses, No clubbing, cyanosis, or edema  	PSYCH: AAOx3  	NEUROLOGY: non-focal  SKIN: No rashes or lesions

## 2019-12-09 NOTE — PROGRESS NOTE ADULT - SUBJECTIVE AND OBJECTIVE BOX
GENERAL SURGERY PROGRESS NOTE     RICHI CHIANG  24y  Female  Hospital day :1d  POD:  Procedure:   OVERNIGHT EVENTS: No acute events    T(F): 100.2 (19 @ 06:05), Max: 102.7 (19 @ 13:35)  HR: 95 (19 @ 06:05) (87 - 111)  BP: 125/55 (19 @ 06:05) (106/57 - 133/62)  RR: 18 (19 @ 06:05) (16 - 20)  SpO2: 98% (19 @ 20:08) (98% - 99%)    DIET/FLUIDS: lactated ringers. 1000 milliLiter(s) IV Continuous <Continuous>      GI proph:  pantoprazole  Injectable 40 milliGRAM(s) IV Push two times a day    AC/ proph:   ABx: meropenem  IVPB 1000 milliGRAM(s) IV Intermittent every 8 hours      PHYSICAL EXAM:  GENERAL: NAD  CHEST/LUNG: Clear to auscultation bilaterally  HEART: Regular rate and rhythm  ABDOMEN: Soft, Nontender, Nondistended;   EXTREMITIES:  No clubbing, cyanosis, or edema      LABS  Labs:  CAPILLARY BLOOD GLUCOSE                              11.3   16.78 )-----------( 233      ( 09 Dec 2019 05:21 )             32.9       Auto Neutrophil %: 82.9 % (19 @ 10:48)  Auto Immature Granulocyte %: 1.0 % (19 @ 10:48)        134<L>  |  98  |  7<L>  ----------------------------<  110<H>  3.1<L>   |  19  |  0.7      Calcium, Total Serum: 8.0 mg/dL (19 @ 05:21)      LFTs:             7.3  | 0.3  | 18       ------------------[53      ( 08 Dec 2019 10:48 )  3.7  | x    | 10          Lipase:12     Amylase:x             Coags:     14.20  ----< 1.24    ( 08 Dec 2019 10:48 )     26.5                Urinalysis Basic - ( 08 Dec 2019 11:10 )    Color: Light Yellow / Appearance: Slightly Turbid / S.009 / pH: x  Gluc: x / Ketone: Small  / Bili: Negative / Urobili: <2 mg/dL   Blood: x / Protein: 30 mg/dL / Nitrite: Negative   Leuk Esterase: Moderate / RBC: 3 /HPF / WBC 23 /HPF   Sq Epi: x / Non Sq Epi: 3 /HPF / Bacteria: Few            RADIOLOGY & ADDITIONAL TESTS:    < from: Xray Esophagram (19 @ 17:03) >  INTERPRETATION:  Clinical History / Reason for exam: Pneumomediastinum  This study was performed with water-soluble contrast given by mouth under   fluoroscopic monitoring. X-raytechnician Ally was observed in the exam   and radiology resident ezequiel was assisting the exam.  There is no impediment to the flow of contrast through the esophagus into   the stomach.  No contrast extravasation is seen.  Impression  No evidence of esophageal leak.    < end of copied text >

## 2019-12-09 NOTE — PROGRESS NOTE ADULT - ATTENDING COMMENTS
General Thoracic Surgery Attestation    I have seen and examined the patient.  Where appropriate I have updated, edited, or corrected the resident's or PA's note with regard to findings, values, and plan.    patient stable, no tachycardia, imaging reviewed.  wbc elevated but stable, likely due to pyelo.  may have clears from the standpoint of thoracic surgery.

## 2019-12-09 NOTE — ED ADULT NURSE REASSESSMENT NOTE - NS ED NURSE REASSESS COMMENT FT1
pt given nausea medication x3; MD aware pt is having recurrent episodes of nausea + requesting to drink water. npo order still active / spoke with MD 3001 as per hold off on fluid intake because of risk for aspirations. explained to pt reason for npo status. pt still requests to speak with MD.  pending eval from MD @3001. WIll continue to monitor / assess

## 2019-12-09 NOTE — ED ADULT NURSE REASSESSMENT NOTE - NS ED NURSE REASSESS COMMENT FT1
pt aox4 denies nausea at this time, resting comfortable. meds to be administered. abdomen soft non tender, bowel sounds present. pt remains npo overnight;  respirations even / unlabored. MD aware of low grade temp. Will continue to monitor / assess

## 2019-12-09 NOTE — CONSULT NOTE ADULT - ATTENDING COMMENTS
23 y/o female diagnosed with pyelonephritis presented with fevers/vomiting, noted to have extensive pneumomediastinum.  CT surgery was asked to evaluate patient for pneumomediastinum  Patient with leukocytosis  +hydronephrosis on the left  +h/o vomiting  there is a concern for esophageal microperf  recommend barium swallow  IV abx broad spectrum  NPO for now
PATIENT SEEN AND EXAMINED, AGREE WITH ABOVE, PNEUMOMEDIASTINUM DUE TO VOMITING/ L PYELO/ BARIUM SWALLOW, IV ABX, PULM STANDPOINT OP F/UP

## 2019-12-10 ENCOUNTER — TRANSCRIPTION ENCOUNTER (OUTPATIENT)
Age: 24
End: 2019-12-10

## 2019-12-10 VITALS
HEART RATE: 81 BPM | RESPIRATION RATE: 20 BRPM | SYSTOLIC BLOOD PRESSURE: 121 MMHG | TEMPERATURE: 99 F | DIASTOLIC BLOOD PRESSURE: 75 MMHG

## 2019-12-10 LAB
ANION GAP SERPL CALC-SCNC: 17 MMOL/L — HIGH (ref 7–14)
BASOPHILS # BLD AUTO: 0.03 K/UL — SIGNIFICANT CHANGE UP (ref 0–0.2)
BASOPHILS NFR BLD AUTO: 0.2 % — SIGNIFICANT CHANGE UP (ref 0–1)
BUN SERPL-MCNC: 6 MG/DL — LOW (ref 10–20)
CALCIUM SERPL-MCNC: 7.9 MG/DL — LOW (ref 8.5–10.1)
CHLORIDE SERPL-SCNC: 96 MMOL/L — LOW (ref 98–110)
CO2 SERPL-SCNC: 19 MMOL/L — SIGNIFICANT CHANGE UP (ref 17–32)
CREAT SERPL-MCNC: 0.6 MG/DL — LOW (ref 0.7–1.5)
EOSINOPHIL # BLD AUTO: 0 K/UL — SIGNIFICANT CHANGE UP (ref 0–0.7)
EOSINOPHIL NFR BLD AUTO: 0 % — SIGNIFICANT CHANGE UP (ref 0–8)
GLUCOSE SERPL-MCNC: 100 MG/DL — HIGH (ref 70–99)
HCT VFR BLD CALC: 34.4 % — LOW (ref 37–47)
HGB BLD-MCNC: 11.9 G/DL — LOW (ref 12–16)
IMM GRANULOCYTES NFR BLD AUTO: 0.8 % — HIGH (ref 0.1–0.3)
LYMPHOCYTES # BLD AUTO: 1.7 K/UL — SIGNIFICANT CHANGE UP (ref 1.2–3.4)
LYMPHOCYTES # BLD AUTO: 11.7 % — LOW (ref 20.5–51.1)
MAGNESIUM SERPL-MCNC: 1.8 MG/DL — SIGNIFICANT CHANGE UP (ref 1.8–2.4)
MCHC RBC-ENTMCNC: 29.2 PG — SIGNIFICANT CHANGE UP (ref 27–31)
MCHC RBC-ENTMCNC: 34.6 G/DL — SIGNIFICANT CHANGE UP (ref 32–37)
MCV RBC AUTO: 84.3 FL — SIGNIFICANT CHANGE UP (ref 81–99)
MONOCYTES # BLD AUTO: 2.03 K/UL — HIGH (ref 0.1–0.6)
MONOCYTES NFR BLD AUTO: 14 % — HIGH (ref 1.7–9.3)
NEUTROPHILS # BLD AUTO: 10.59 K/UL — HIGH (ref 1.4–6.5)
NEUTROPHILS NFR BLD AUTO: 73.3 % — SIGNIFICANT CHANGE UP (ref 42.2–75.2)
NRBC # BLD: 0 /100 WBCS — SIGNIFICANT CHANGE UP (ref 0–0)
PLATELET # BLD AUTO: 263 K/UL — SIGNIFICANT CHANGE UP (ref 130–400)
POTASSIUM SERPL-MCNC: 3.3 MMOL/L — LOW (ref 3.5–5)
POTASSIUM SERPL-SCNC: 3.3 MMOL/L — LOW (ref 3.5–5)
RBC # BLD: 4.08 M/UL — LOW (ref 4.2–5.4)
RBC # FLD: 12.3 % — SIGNIFICANT CHANGE UP (ref 11.5–14.5)
SODIUM SERPL-SCNC: 132 MMOL/L — LOW (ref 135–146)
WBC # BLD: 14.47 K/UL — HIGH (ref 4.8–10.8)
WBC # FLD AUTO: 14.47 K/UL — HIGH (ref 4.8–10.8)

## 2019-12-10 PROCEDURE — 99239 HOSP IP/OBS DSCHRG MGMT >30: CPT

## 2019-12-10 PROCEDURE — 99232 SBSQ HOSP IP/OBS MODERATE 35: CPT

## 2019-12-10 PROCEDURE — 71046 X-RAY EXAM CHEST 2 VIEWS: CPT | Mod: 26

## 2019-12-10 RX ORDER — CEFPODOXIME PROXETIL 100 MG
200 TABLET ORAL EVERY 12 HOURS
Refills: 0 | Status: DISCONTINUED | OUTPATIENT
Start: 2019-12-10 | End: 2019-12-10

## 2019-12-10 RX ORDER — PANTOPRAZOLE SODIUM 20 MG/1
1 TABLET, DELAYED RELEASE ORAL
Qty: 14 | Refills: 0
Start: 2019-12-10 | End: 2019-12-23

## 2019-12-10 RX ORDER — BENZOCAINE AND MENTHOL 5; 1 G/100ML; G/100ML
1 LIQUID ORAL ONCE
Refills: 0 | Status: COMPLETED | OUTPATIENT
Start: 2019-12-10 | End: 2019-12-10

## 2019-12-10 RX ORDER — POTASSIUM CHLORIDE 20 MEQ
40 PACKET (EA) ORAL EVERY 4 HOURS
Refills: 0 | Status: DISCONTINUED | OUTPATIENT
Start: 2019-12-10 | End: 2019-12-10

## 2019-12-10 RX ORDER — CEFPODOXIME PROXETIL 100 MG
1 TABLET ORAL
Qty: 8 | Refills: 0
Start: 2019-12-10 | End: 2019-12-13

## 2019-12-10 RX ADMIN — MEROPENEM 100 MILLIGRAM(S): 1 INJECTION INTRAVENOUS at 14:51

## 2019-12-10 RX ADMIN — INFLUENZA VIRUS VACCINE 0.5 MILLILITER(S): 15; 15; 15; 15 SUSPENSION INTRAMUSCULAR at 15:42

## 2019-12-10 RX ADMIN — ONDANSETRON 4 MILLIGRAM(S): 8 TABLET, FILM COATED ORAL at 02:24

## 2019-12-10 RX ADMIN — MEROPENEM 100 MILLIGRAM(S): 1 INJECTION INTRAVENOUS at 05:46

## 2019-12-10 RX ADMIN — ONDANSETRON 4 MILLIGRAM(S): 8 TABLET, FILM COATED ORAL at 08:25

## 2019-12-10 RX ADMIN — BENZOCAINE AND MENTHOL 1 LOZENGE: 5; 1 LIQUID ORAL at 09:44

## 2019-12-10 RX ADMIN — Medication 40 MILLIEQUIVALENT(S): at 15:37

## 2019-12-10 RX ADMIN — PANTOPRAZOLE SODIUM 40 MILLIGRAM(S): 20 TABLET, DELAYED RELEASE ORAL at 05:46

## 2019-12-10 NOTE — DISCHARGE NOTE PROVIDER - PROVIDER TOKENS
PROVIDER:[TOKEN:[[id number]],FOLLOWUP:[Routine]],PROVIDER:[TOKEN:[[id number]],FOLLOWUP:[1-3 days]]

## 2019-12-10 NOTE — PROGRESS NOTE ADULT - ATTENDING COMMENTS
Patient seen and examined independently. I agree with the resident's note, physical exam, and plan except as below.  Vital Signs Last 24 Hrs  T(C): 37.1 (10 Dec 2019 12:47), Max: 37.8 (10 Dec 2019 04:31)  T(F): 98.7 (10 Dec 2019 12:47), Max: 100 (10 Dec 2019 04:31)  HR: 81 (10 Dec 2019 12:47) (77 - 90)  BP: 121/75 (10 Dec 2019 12:47) (115/70 - 125/80)  BP(mean): --  RR: 20 (10 Dec 2019 12:47) (18 - 20)  SpO2: --  Pe  nad  aaox3  [id number]  ctabl  soft ntnd+_bs  no cce    pt anxious to go home  asymptomatic  - no dysuria, vomiting , nausea  - tolerating clears      #sepsis due to pyleo - started empricially on gasper - bcx negative ucx never sent  cliinically improved, leukocytosis decreasing afebrile  change to po vantin 200mg q12 for 4 more days  #pneumomediastinum - possibly due to wretching - esophageal study shows no leak - being followed by [facility]  discussed with Dr hugo in CTU - may advance diet with outpt follow up in office    [facility]t dec [facility]th @1230pm  stable to dc home  meds reveiwed  pt counselled  time spent 35 mins

## 2019-12-10 NOTE — PROGRESS NOTE ADULT - SUBJECTIVE AND OBJECTIVE BOX
Hospital Day:  2d    Subjective:    Patient is a 24y old  Female who presents with a chief complaint of suspected pyelonephritis and Pneumomediastinum (10 Dec 2019 09:10)    There were no acute overnight events. The patient was seen and examined at the bedside. Patient continues to complain of nausea. States that she had some dry heaving over night. Currently keeping down clear liquids. Expressing desire to go home. Denies fever, chills, headache, dizziness, chest pain, palpitations, shortness of breath, flank pain, dysuria.    Past Medical Hx:   No pertinent past medical history    Past Sx:  No significant past surgical history    Allergies:  No Known Allergies    Current Meds:   Standng Meds:  influenza   Vaccine 0.5 milliLiter(s) IntraMuscular once  melatonin 5 milliGRAM(s) Oral at bedtime  meropenem  IVPB 1000 milliGRAM(s) IV Intermittent every 8 hours  pantoprazole  Injectable 40 milliGRAM(s) IV Push two times a day    PRN Meds:  ondansetron Injectable 4 milliGRAM(s) IV Push every 6 hours PRN Nausea and/or Vomiting    HOME MEDICATIONS:      Vital Signs:   T(F): 98.7 (12-10-19 @ 12:47), Max: 100 (12-10-19 @ 04:31)  HR: 81 (12-10-19 @ 12:47) (77 - 90)  BP: 121/75 (12-10-19 @ 12:47) (115/70 - 125/80)  RR: 20 (12-10-19 @ 12:47) (18 - 20)  SpO2: --        Physical Exam:   General: Patient resting comfortably in bed, NAD  HEENT: NCAT, conjunctiva clear, sclera white, PEERLA, EOMI, moist mucous membranes, normal orophaynx  Neck: No masses, no JVD, no cervical lymphadenopathy  Respiratory: good inspiratory effort; lungs clear to auscultation bilaterally  CV: Normal rate, regular rhythm, normal S1/S2, no rubs, gallops, murmurs  GI: abdomen soft, non-tender, non-distended, no masses, normal bowel sounds  Back: no CVA tenderness  Extremities: Well-perfused, no clubbing, no cyanosis, no lower extremity edema bilaterally  Skin: warm and dry  Neurology: AAOx3, mentating appropriately, follows commands, nonfocal    Labs:                         11.9   14.47 )-----------( 263      ( 10 Dec 2019 06:04 )             34.4     Neutophil% 73.3, Lymphocyte% 11.7, Monocyte% 14.0, Bands% 0.8 12-10-19 @ 06:04    10 Dec 2019 06:04    132    |  96     |  6      ----------------------------<  100    3.3     |  19     |  0.6      Ca    7.9        10 Dec 2019 06:04  Mg     1.8       10 Dec 2019 06:04          Amylase --, Lipase 12, 19 @ 10:48              Urinalysis Basic - ( 08 Dec 2019 11:10 )    Color: Light Yellow / Appearance: Slightly Turbid / S.009 / pH: x  Gluc: x / Ketone: Small  / Bili: Negative / Urobili: <2 mg/dL   Blood: x / Protein: 30 mg/dL / Nitrite: Negative   Leuk Esterase: Moderate / RBC: 3 /HPF / WBC 23 /HPF   Sq Epi: x / Non Sq Epi: 3 /HPF / Bacteria: Few          Culture - Blood (collected 19 @ 05:21)  Source: .Blood None  Preliminary Report (12-10-19 @ 13:01):    No growth to date.    Culture - Blood (collected 19 @ 00:23)  Source: .Blood None  Preliminary Report (12-10-19 @ 09:01):    No growth to date.        Radiology:   < from: Xray Chest 2 Views PA/Lat (12.10.19 @ 07:47) >    EXAM:  XR CHEST PA LAT 2V            PROCEDURE DATE:  12/10/2019            INTERPRETATION:  Clinical History / Reason for exam: Pneumomediastinum.    Comparison : Chest radiograph prior day.    Technique/Positioning: Frontal and lateral.    Findings:    Support devices: None.    Cardiac/mediastinum/hilum: Unremarkable.    Lung parenchyma/Pleura: Within normal limits.    Skeleton/soft tissues: Unremarkable.    Impression:      No radiographic evidence of acute cardiopulmonary disease.    Unchanged.                  AARON CHAVEZ M.D., ATTENDING RADIOLOGIST  This document has been electronically signed. Dec 10 2019 11:52AM                < end of copied text >

## 2019-12-10 NOTE — DISCHARGE NOTE PROVIDER - CARE PROVIDERS DIRECT ADDRESSES
[email or web address]

## 2019-12-10 NOTE — PROGRESS NOTE ADULT - ASSESSMENT
Ms. Murray is a 24 year old female, no significant PMH , comes in with complaint of abdominal pain, dull, non radiating, located in LLQ.    #) Sepsis secondary to Left sided Pyelonephritis  - First urinary tract infection as per patient, no previous ab use  - CT scan showing radiologic evidence of left sided pyelonephritis  - Kidney US: no evidence of hydronephrosis  - UCx not sent; will not resend as patient has been on antibiotics  - BCx: NGTD  - IV meropenum for now  - pain and fever control    #) Pneumomediastinum probably secondary to Microperforation   - CT surgery following.  - Water soluble swallow test: negative  - CXR (12/9): no evidence of pneumomediastinum; subcutaneous emphysema  - CXR (12/10): negative  - IV Protonix  - zofran PRN  - Clear liquid diet; follow up CT surgery regarding advancing diet    #) Hypokalemia  - Potassium repletion    #) Hypovolemic Hyponatremia  - secondary to profuse vomiting  - Improving

## 2019-12-10 NOTE — DISCHARGE NOTE NURSING/CASE MANAGEMENT/SOCIAL WORK - PATIENT PORTAL LINK FT
You can access the FollowMyHealth Patient Portal offered by [facility] by registering at the following website: [email or web address]. By joining [facility]’s FollowMyHealth portal, you will also be able to view your health information using other applications (apps) compatible with our system.

## 2019-12-10 NOTE — DISCHARGE NOTE PROVIDER - NSDCCPCAREPLAN_GEN_ALL_CORE_FT
PRINCIPAL DISCHARGE DIAGNOSIS  Diagnosis: Pyelonephritis  Assessment and Plan of Treatment:       SECONDARY DISCHARGE DIAGNOSES  Diagnosis: Pneumomediastinum  Assessment and Plan of Treatment: Your workup for esophageal perforation was negative. Please return to the ED if you PRINCIPAL DISCHARGE DIAGNOSIS  Diagnosis: Pyelonephritis  Assessment and Plan of Treatment: You were found to have an infection in your left kidney. Please continue to take your antibiotics twice daily for the next 4 days. Please follow up with your primary care doctor if your symptoms persist or return.  Please return to the emergency room if you experience high fever, severe flank pain, nausea/vomiting, dizziness, or fainting spells.      SECONDARY DISCHARGE DIAGNOSES  Diagnosis: Pneumomediastinum  Assessment and Plan of Treatment: During the course of your workup you were found to have air in your chest. It is thought that this may have come from a small hole or tear in your esophagus due to wretching/vomiting. The esophagram study you had showed no leakage of fluid from the esophagus. It is important that you stick to a "full liquid" diet until you are reevaluated by the thoracic surgeon in the clinic. Please call Dr. Elliott's office to make an appointment.

## 2019-12-10 NOTE — DISCHARGE NOTE PROVIDER - NSDCMRMEDTOKEN_GEN_ALL_CORE_FT
cefpodoxime 200 mg oral tablet: 1 tab(s) orally every 12 hours for 4 days  pantoprazole 40 mg oral delayed release tablet: 1 tab(s) orally once a day

## 2019-12-10 NOTE — PROGRESS NOTE ADULT - SUBJECTIVE AND OBJECTIVE BOX
GENERAL SURGERY PROGRESS NOTE     RICHI CHIANG  24y  Female  Hospital day :2d  POD:  Procedure:   OVERNIGHT EVENTS: No acute events. Pt has no complaints and denies chest pain.    T(F): 100 (12-10-19 @ 04:31), Max: 100 (12-10-19 @ 04:31)  HR: 77 (12-10-19 @ 04:31) (77 - 90)  BP: 115/70 (12-10-19 @ 04:31) (115/70 - 125/80)  ABP: --  ABP(mean): --  RR: 18 (12-10-19 @ 04:31) (18 - 18)  SpO2: --      GI proph:  pantoprazole  Injectable 40 milliGRAM(s) IV Push two times a day    AC/ proph:   ABx: meropenem  IVPB 1000 milliGRAM(s) IV Intermittent every 8 hours      PHYSICAL EXAM:  GENERAL: NAD, well-appearing  CHEST/LUNG: Clear to auscultation bilaterally  HEART: Regular rate and rhythm  ABDOMEN: Soft, Nontender, Nondistended;   EXTREMITIES:  No clubbing, cyanosis, or edema      LABS  Labs:  CAPILLARY BLOOD GLUCOSE                              11.9   14.47 )-----------( 263      ( 10 Dec 2019 06:04 )             34.4       Auto Neutrophil %: 73.3 % (12-10-19 @ 06:04)  Auto Immature Granulocyte %: 0.8 % (12-10-19 @ 06:04)    12-10    132<L>  |  96<L>  |  6<L>  ----------------------------<  100<H>  3.3<L>   |  19  |  0.6<L>      Calcium, Total Serum: 7.9 mg/dL (12-10-19 @ 06:04)      LFTs:             7.3  | 0.3  | 18       ------------------[53      ( 08 Dec 2019 10:48 )  3.7  | x    | 10          Lipase:12     Amylase:x             Coags:     14.20  ----< 1.24    ( 08 Dec 2019 10:48 )     26.5                Urinalysis Basic - ( 08 Dec 2019 11:10 )    Color: Light Yellow / Appearance: Slightly Turbid / S.009 / pH: x  Gluc: x / Ketone: Small  / Bili: Negative / Urobili: <2 mg/dL   Blood: x / Protein: 30 mg/dL / Nitrite: Negative   Leuk Esterase: Moderate / RBC: 3 /HPF / WBC 23 /HPF   Sq Epi: x / Non Sq Epi: 3 /HPF / Bacteria: Few        Culture - Blood (collected 09 Dec 2019 00:23)  Source: .Blood None  Preliminary Report (10 Dec 2019 09:01):    No growth to date.          RADIOLOGY & ADDITIONAL TESTS:      PENDING CXR 12/10/19      < from: Xray Chest 2 Views PA/Lat (19 @ 09:42) >  Impression:      No radiographic evidence for pneumomediastinum.    No pneumothorax.    < end of copied text >

## 2019-12-10 NOTE — DISCHARGE NOTE PROVIDER - INSTRUCTIONS
It is important that you stick to a "full liquid" diet until you are reevaluated by the thoracic surgeon in clinic. A full liquid diet is made up only of fluids and foods that are normally liquid and foods that turn to liquid when they are at room temperature, like ice cream. These types of fluids/food include, but are not limited to: water, fruit juices, yogurt (no added fruit, nuts, or solids), custard, pudding, ice cream (without added nuts or solids), frozen yogurt, sherbet, fruit ices and popsicles, sugar, honey, syrup, soup broth (including strained cream soups, but no solids), sodas, Boost, Ensure, other liquid supplements, tea or coffee with cream or milk and sugar.    Do not eat any solids, including any kind of cheese, fruit, meat, or cereals. Liquid foods do NOT include mashed foods, like mashed potatoes or avocados.

## 2019-12-10 NOTE — DISCHARGE NOTE PROVIDER - HOSPITAL COURSE
24 year old female, no significant PMH, comes in with complaint of abdominal pain, dull, non radiating, located in LLQ. Pt endorsed pain along with urinary symptoms. Met sepsis criteria on presentation and received 2 litres NS along with Levaquin in the ED. Ct scan consistent with Left sided Pyelonephritis along with Pneumomediastinum secondary to vomiting. Patient was placed on IV meropenem and admitted to medicine. Repeat chest xrays showed resolution of pneumomediastinum and esophagram showed no leak. Patient was cleared by CT Surgery, and is medically stable for discharge on PO vantin with outpatient follow up.

## 2019-12-10 NOTE — DISCHARGE NOTE PROVIDER - CARE PROVIDER_API CALL
Rona Rosenberg)  Internal Medicine  [address] [place] [address]  Phone: [phone]  Fax: [phone]  Follow Up Time: Routine    Rj Elliott)  Surgery; Thoracic Surgery  [address] [place] [address], [address]  [place] [address]  Phone: [phone]  Fax: [phone]  Follow Up Time: 1-3 days

## 2019-12-11 PROBLEM — Z78.9 OTHER SPECIFIED HEALTH STATUS: Chronic | Status: ACTIVE | Noted: 2019-12-08

## 2019-12-12 DIAGNOSIS — J98.2 INTERSTITIAL EMPHYSEMA: ICD-10-CM

## 2019-12-12 DIAGNOSIS — E87.6 HYPOKALEMIA: ICD-10-CM

## 2019-12-12 DIAGNOSIS — E87.1 HYPO-OSMOLALITY AND HYPONATREMIA: ICD-10-CM

## 2019-12-12 DIAGNOSIS — A41.9 SEPSIS, UNSPECIFIED ORGANISM: ICD-10-CM

## 2019-12-12 DIAGNOSIS — R11.10 VOMITING, UNSPECIFIED: ICD-10-CM

## 2019-12-12 DIAGNOSIS — N12 TUBULO-INTERSTITIAL NEPHRITIS, NOT SPECIFIED AS ACUTE OR CHRONIC: ICD-10-CM

## 2019-12-14 LAB
CULTURE RESULTS: SIGNIFICANT CHANGE UP
CULTURE RESULTS: SIGNIFICANT CHANGE UP
SPECIMEN SOURCE: SIGNIFICANT CHANGE UP
SPECIMEN SOURCE: SIGNIFICANT CHANGE UP

## 2019-12-17 ENCOUNTER — FORM ENCOUNTER (OUTPATIENT)
Age: 24
End: 2019-12-17

## 2019-12-18 ENCOUNTER — OUTPATIENT (OUTPATIENT)
Dept: OUTPATIENT SERVICES | Facility: HOSPITAL | Age: 24
LOS: 1 days | Discharge: HOME | End: 2019-12-18
Payer: COMMERCIAL

## 2019-12-18 ENCOUNTER — APPOINTMENT (OUTPATIENT)
Dept: CARDIOTHORACIC SURGERY | Facility: CLINIC | Age: 24
End: 2019-12-18
Payer: COMMERCIAL

## 2019-12-18 VITALS
DIASTOLIC BLOOD PRESSURE: 82 MMHG | OXYGEN SATURATION: 98 % | BODY MASS INDEX: 28.93 KG/M2 | HEIGHT: 66 IN | TEMPERATURE: 98.4 F | WEIGHT: 180 LBS | HEART RATE: 80 BPM | SYSTOLIC BLOOD PRESSURE: 134 MMHG | RESPIRATION RATE: 12 BRPM

## 2019-12-18 DIAGNOSIS — J98.2 INTERSTITIAL EMPHYSEMA: ICD-10-CM

## 2019-12-18 DIAGNOSIS — Z80.3 FAMILY HISTORY OF MALIGNANT NEOPLASM OF BREAST: ICD-10-CM

## 2019-12-18 PROCEDURE — 99213 OFFICE O/P EST LOW 20 MIN: CPT

## 2019-12-18 PROCEDURE — 71046 X-RAY EXAM CHEST 2 VIEWS: CPT | Mod: 26

## 2019-12-18 NOTE — PHYSICAL EXAM
[Sclera] : the sclera and conjunctiva were normal [PERRL With Normal Accommodation] : pupils were equal in size, round, and reactive to light [Neck Appearance] : the appearance of the neck was normal [Extraocular Movements] : extraocular movements were intact [Jugular Venous Distention Increased] : there was no jugular-venous distention [Thyroid Diffuse Enlargement] : the thyroid was not enlarged [Neck Cervical Mass (___cm)] : no neck mass was observed [Auscultation Breath Sounds / Voice Sounds] : lungs were clear to auscultation bilaterally [Thyroid Nodule] : there were no palpable thyroid nodules [Murmurs] : no murmurs [Heart Sounds Gallop] : no gallops [Heart Sounds] : normal S1 and S2 [Heart Rate And Rhythm] : heart rate was normal and rhythm regular [Heart Sounds Pericardial Friction Rub] : no pericardial rub [Bowel Sounds] : normal bowel sounds [Abdomen Soft] : soft [Abdomen Tenderness] : non-tender [Abdomen Mass (___ Cm)] : no abdominal mass palpated [Abnormal Walk] : normal gait [Nail Clubbing] : no clubbing  or cyanosis of the fingernails [Musculoskeletal - Swelling] : no joint swelling seen [Motor Tone] : muscle strength and tone were normal [] : no rash [Skin Color & Pigmentation] : normal skin color and pigmentation [Skin Turgor] : normal skin turgor [Deep Tendon Reflexes (DTR)] : deep tendon reflexes were 2+ and symmetric [Sensation] : the sensory exam was normal to light touch and pinprick [No Focal Deficits] : no focal deficits [Oriented To Time, Place, And Person] : oriented to person, place, and time [Impaired Insight] : insight and judgment were intact [Affect] : the affect was normal

## 2019-12-19 NOTE — DATA REVIEWED
[FreeTextEntry1] : EXAM: CT CHEST PROCEDURE DATE: 12/08/2019 \par \par FINDINGS: \par MEDIASTINUM/GREAT VESSELS: There is pneumomediastinum extending from \par superiorly to the thoracic inlet (out of field-of-view) to surround the \par trachea and peribronchial spaces, esophagus, great vessels, the myocardium, \par and extending inferiorly through the diaphragmatic crux. No pericardial \par effusion. Heart size is within normal limits. The aorta and main pulmonary \par artery are of normal caliber.

## 2019-12-19 NOTE — HISTORY OF PRESENT ILLNESS
[FreeTextEntry1] : Miss. Murray is a 24 year old female with no significant PM was recently in the ER for abdominal pain. She was diagnosed with pyelonephritis and during her workup it was diagnosed that she had pneumomediastinum.  She arrives for a follow up up visit. \par \par PMD Dr. Mancera

## 2019-12-19 NOTE — ASSESSMENT
[FreeTextEntry1] : Miss. Murray is a 24 year old female with no significant PM was recently in the ER for abdominal pain. She was diagnosed with pyelonephritis and during her workup it was diagnosed that she had pneumomediastinum.  She arrives for a follow up up visit.  She was placed on a liquid diet for a 1 week. TOday she will obtain a CXR PA / LAT. May resume normal diet. RTO as needed.

## 2021-03-10 ENCOUNTER — APPOINTMENT (OUTPATIENT)
Dept: PULMONOLOGY | Facility: CLINIC | Age: 26
End: 2021-03-10
Payer: COMMERCIAL

## 2021-03-10 VITALS
HEART RATE: 76 BPM | RESPIRATION RATE: 12 BRPM | SYSTOLIC BLOOD PRESSURE: 124 MMHG | WEIGHT: 190 LBS | DIASTOLIC BLOOD PRESSURE: 74 MMHG | BODY MASS INDEX: 30.53 KG/M2 | HEIGHT: 66 IN | OXYGEN SATURATION: 98 %

## 2021-03-10 DIAGNOSIS — J45.909 UNSPECIFIED ASTHMA, UNCOMPLICATED: ICD-10-CM

## 2021-03-10 DIAGNOSIS — J98.2 INTERSTITIAL EMPHYSEMA: ICD-10-CM

## 2021-03-10 PROCEDURE — 99213 OFFICE O/P EST LOW 20 MIN: CPT

## 2021-03-10 PROCEDURE — 99203 OFFICE O/P NEW LOW 30 MIN: CPT

## 2021-03-10 PROCEDURE — 99072 ADDL SUPL MATRL&STAF TM PHE: CPT

## 2021-03-10 RX ORDER — BUDESONIDE AND FORMOTEROL FUMARATE DIHYDRATE 160; 4.5 UG/1; UG/1
160-4.5 AEROSOL RESPIRATORY (INHALATION) TWICE DAILY
Qty: 1 | Refills: 3 | Status: ACTIVE | COMMUNITY
Start: 2021-03-10 | End: 1900-01-01

## 2021-03-10 NOTE — HISTORY OF PRESENT ILLNESS
[Consultation] : a consultation regarding [Wheezing] : wheezing [Chest Tightness] : chest tightness [Dyspnea on Exertion] : dyspnea on exertion [Currently Experiencing] : The patient is currently experiencing symptoms. [Gradual] : gradual [Intermittent] : intermittently [Moderate] : moderate in severity [Activity] : activity [Inhaled Albuterol] : inhaled albuterol [None] : The patient is not currently being treated for this problem

## 2021-07-04 ENCOUNTER — EMERGENCY (EMERGENCY)
Facility: HOSPITAL | Age: 26
LOS: 0 days | Discharge: HOME | End: 2021-07-05
Attending: STUDENT IN AN ORGANIZED HEALTH CARE EDUCATION/TRAINING PROGRAM | Admitting: STUDENT IN AN ORGANIZED HEALTH CARE EDUCATION/TRAINING PROGRAM
Payer: COMMERCIAL

## 2021-07-04 VITALS
RESPIRATION RATE: 18 BRPM | HEIGHT: 65 IN | TEMPERATURE: 100 F | OXYGEN SATURATION: 99 % | DIASTOLIC BLOOD PRESSURE: 57 MMHG | SYSTOLIC BLOOD PRESSURE: 112 MMHG | HEART RATE: 117 BPM | WEIGHT: 195.11 LBS

## 2021-07-04 DIAGNOSIS — M79.10 MYALGIA, UNSPECIFIED SITE: ICD-10-CM

## 2021-07-04 DIAGNOSIS — R11.2 NAUSEA WITH VOMITING, UNSPECIFIED: ICD-10-CM

## 2021-07-04 DIAGNOSIS — M54.5 LOW BACK PAIN: ICD-10-CM

## 2021-07-04 DIAGNOSIS — R50.9 FEVER, UNSPECIFIED: ICD-10-CM

## 2021-07-04 DIAGNOSIS — N12 TUBULO-INTERSTITIAL NEPHRITIS, NOT SPECIFIED AS ACUTE OR CHRONIC: ICD-10-CM

## 2021-07-04 PROCEDURE — 99285 EMERGENCY DEPT VISIT HI MDM: CPT

## 2021-07-04 RX ORDER — SODIUM CHLORIDE 9 MG/ML
1000 INJECTION INTRAMUSCULAR; INTRAVENOUS; SUBCUTANEOUS ONCE
Refills: 0 | Status: COMPLETED | OUTPATIENT
Start: 2021-07-04 | End: 2021-07-04

## 2021-07-04 RX ORDER — KETOROLAC TROMETHAMINE 30 MG/ML
15 SYRINGE (ML) INJECTION ONCE
Refills: 0 | Status: DISCONTINUED | OUTPATIENT
Start: 2021-07-04 | End: 2021-07-04

## 2021-07-04 RX ORDER — ONDANSETRON 8 MG/1
4 TABLET, FILM COATED ORAL ONCE
Refills: 0 | Status: COMPLETED | OUTPATIENT
Start: 2021-07-04 | End: 2021-07-05

## 2021-07-04 NOTE — ED ADULT TRIAGE NOTE - CHIEF COMPLAINT QUOTE
Patient reports feeling nausea, vomiting and body aches x3days. Patient was seen at [facility] and WBC are at 12.8 rapid COVID swab negative.

## 2021-07-05 ENCOUNTER — TRANSCRIPTION ENCOUNTER (OUTPATIENT)
Age: 26
End: 2021-07-05

## 2021-07-05 VITALS
OXYGEN SATURATION: 98 % | TEMPERATURE: 99 F | HEART RATE: 92 BPM | SYSTOLIC BLOOD PRESSURE: 108 MMHG | DIASTOLIC BLOOD PRESSURE: 58 MMHG | RESPIRATION RATE: 18 BRPM

## 2021-07-05 LAB
ALBUMIN SERPL ELPH-MCNC: 3.9 G/DL — SIGNIFICANT CHANGE UP (ref 3.5–5.2)
ALP SERPL-CCNC: 52 U/L — SIGNIFICANT CHANGE UP (ref 30–115)
ALT FLD-CCNC: 10 U/L — SIGNIFICANT CHANGE UP (ref 0–41)
ANION GAP SERPL CALC-SCNC: 13 MMOL/L — SIGNIFICANT CHANGE UP (ref 7–14)
APPEARANCE UR: ABNORMAL
AST SERPL-CCNC: 11 U/L — SIGNIFICANT CHANGE UP (ref 0–41)
BACTERIA # UR AUTO: ABNORMAL
BASOPHILS # BLD AUTO: 0.02 K/UL — SIGNIFICANT CHANGE UP (ref 0–0.2)
BASOPHILS NFR BLD AUTO: 0.1 % — SIGNIFICANT CHANGE UP (ref 0–1)
BILIRUB SERPL-MCNC: 0.3 MG/DL — SIGNIFICANT CHANGE UP (ref 0.2–1.2)
BILIRUB UR-MCNC: NEGATIVE — SIGNIFICANT CHANGE UP
BUN SERPL-MCNC: 6 MG/DL — LOW (ref 10–20)
CALCIUM SERPL-MCNC: 8.6 MG/DL — SIGNIFICANT CHANGE UP (ref 8.5–10.1)
CHLORIDE SERPL-SCNC: 97 MMOL/L — LOW (ref 98–110)
CO2 SERPL-SCNC: 22 MMOL/L — SIGNIFICANT CHANGE UP (ref 17–32)
COLOR SPEC: YELLOW — SIGNIFICANT CHANGE UP
CREAT SERPL-MCNC: 0.8 MG/DL — SIGNIFICANT CHANGE UP (ref 0.7–1.5)
DIFF PNL FLD: ABNORMAL
EOSINOPHIL # BLD AUTO: 0 K/UL — SIGNIFICANT CHANGE UP (ref 0–0.7)
EOSINOPHIL NFR BLD AUTO: 0 % — SIGNIFICANT CHANGE UP (ref 0–8)
EPI CELLS # UR: 2 /HPF — SIGNIFICANT CHANGE UP (ref 0–5)
GLUCOSE SERPL-MCNC: 121 MG/DL — HIGH (ref 70–99)
GLUCOSE UR QL: NEGATIVE — SIGNIFICANT CHANGE UP
HCG SERPL QL: NEGATIVE — SIGNIFICANT CHANGE UP
HCT VFR BLD CALC: 36.6 % — LOW (ref 37–47)
HGB BLD-MCNC: 12.2 G/DL — SIGNIFICANT CHANGE UP (ref 12–16)
HYALINE CASTS # UR AUTO: 1 /LPF — SIGNIFICANT CHANGE UP (ref 0–7)
IMM GRANULOCYTES NFR BLD AUTO: 0.4 % — HIGH (ref 0.1–0.3)
KETONES UR-MCNC: ABNORMAL
LACTATE SERPL-SCNC: 0.7 MMOL/L — SIGNIFICANT CHANGE UP (ref 0.7–2)
LEUKOCYTE ESTERASE UR-ACNC: ABNORMAL
LIDOCAIN IGE QN: 18 U/L — SIGNIFICANT CHANGE UP (ref 7–60)
LYMPHOCYTES # BLD AUTO: 0.92 K/UL — LOW (ref 1.2–3.4)
LYMPHOCYTES # BLD AUTO: 6.5 % — LOW (ref 20.5–51.1)
MAGNESIUM SERPL-MCNC: 1.7 MG/DL — LOW (ref 1.8–2.4)
MCHC RBC-ENTMCNC: 28.7 PG — SIGNIFICANT CHANGE UP (ref 27–31)
MCHC RBC-ENTMCNC: 33.3 G/DL — SIGNIFICANT CHANGE UP (ref 32–37)
MCV RBC AUTO: 86.1 FL — SIGNIFICANT CHANGE UP (ref 81–99)
MONOCYTES # BLD AUTO: 2.02 K/UL — HIGH (ref 0.1–0.6)
MONOCYTES NFR BLD AUTO: 14.4 % — HIGH (ref 1.7–9.3)
NEUTROPHILS # BLD AUTO: 11.06 K/UL — HIGH (ref 1.4–6.5)
NEUTROPHILS NFR BLD AUTO: 78.6 % — HIGH (ref 42.2–75.2)
NITRITE UR-MCNC: NEGATIVE — SIGNIFICANT CHANGE UP
NRBC # BLD: 0 /100 WBCS — SIGNIFICANT CHANGE UP (ref 0–0)
PH UR: 6.5 — SIGNIFICANT CHANGE UP (ref 5–8)
PLATELET # BLD AUTO: 288 K/UL — SIGNIFICANT CHANGE UP (ref 130–400)
POTASSIUM SERPL-MCNC: 3.4 MMOL/L — LOW (ref 3.5–5)
POTASSIUM SERPL-SCNC: 3.4 MMOL/L — LOW (ref 3.5–5)
PROT SERPL-MCNC: 7 G/DL — SIGNIFICANT CHANGE UP (ref 6–8)
PROT UR-MCNC: ABNORMAL
RBC # BLD: 4.25 M/UL — SIGNIFICANT CHANGE UP (ref 4.2–5.4)
RBC # FLD: 12.4 % — SIGNIFICANT CHANGE UP (ref 11.5–14.5)
RBC CASTS # UR COMP ASSIST: 6 /HPF — HIGH (ref 0–4)
SODIUM SERPL-SCNC: 132 MMOL/L — LOW (ref 135–146)
SP GR SPEC: 1.02 — SIGNIFICANT CHANGE UP (ref 1.01–1.03)
UROBILINOGEN FLD QL: ABNORMAL
WBC # BLD: 14.07 K/UL — HIGH (ref 4.8–10.8)
WBC # FLD AUTO: 14.07 K/UL — HIGH (ref 4.8–10.8)
WBC UR QL: 204 /HPF — HIGH (ref 0–5)

## 2021-07-05 PROCEDURE — 74177 CT ABD & PELVIS W/CONTRAST: CPT | Mod: 26,MA

## 2021-07-05 PROCEDURE — 71045 X-RAY EXAM CHEST 1 VIEW: CPT | Mod: 26

## 2021-07-05 RX ORDER — CEFPODOXIME PROXETIL 100 MG
1 TABLET ORAL
Qty: 20 | Refills: 0
Start: 2021-07-05 | End: 2021-07-14

## 2021-07-05 RX ORDER — CEFTRIAXONE 500 MG/1
1000 INJECTION, POWDER, FOR SOLUTION INTRAMUSCULAR; INTRAVENOUS ONCE
Refills: 0 | Status: COMPLETED | OUTPATIENT
Start: 2021-07-05 | End: 2021-07-05

## 2021-07-05 RX ADMIN — SODIUM CHLORIDE 1000 MILLILITER(S): 9 INJECTION INTRAMUSCULAR; INTRAVENOUS; SUBCUTANEOUS at 02:25

## 2021-07-05 RX ADMIN — ONDANSETRON 4 MILLIGRAM(S): 8 TABLET, FILM COATED ORAL at 00:46

## 2021-07-05 RX ADMIN — SODIUM CHLORIDE 1000 MILLILITER(S): 9 INJECTION INTRAMUSCULAR; INTRAVENOUS; SUBCUTANEOUS at 00:43

## 2021-07-05 RX ADMIN — CEFTRIAXONE 100 MILLIGRAM(S): 500 INJECTION, POWDER, FOR SOLUTION INTRAMUSCULAR; INTRAVENOUS at 03:17

## 2021-07-05 RX ADMIN — Medication 15 MILLIGRAM(S): at 00:45

## 2021-07-05 RX ADMIN — Medication 15 MILLIGRAM(S): at 01:30

## 2021-07-05 NOTE — ED PROVIDER NOTE - NSFOLLOWUPCLINICS_GEN_ALL_ED_FT
[facility]  Medicine  [address], [place]   Phone: [phone]  Fax:   Follow Up Time: 1-3 Days

## 2021-07-05 NOTE — ED PROVIDER NOTE - CLINICAL SUMMARY MEDICAL DECISION MAKING FREE TEXT BOX
25 yr old f who presents with fever. labs, ua, imaging obtained. pt diagnosed with pylo. discharged with antibiotics, return precautions and pcp follow up.

## 2021-07-05 NOTE — ED PROVIDER NOTE - PHYSICAL EXAMINATION
Physical Exam    Vital Signs: I have reviewed the initial vital signs.  Constitutional: well-nourished, appears stated age, no acute distress  Eyes: Conjunctiva pink, Sclera clear, PERRLA, EOMI.  Cardiovascular: S1 and S2, regular rate, regular rhythm, well-perfused extremities, radial pulses equal and 2+  Respiratory: unlabored respiratory effort, clear to auscultation bilaterally no wheezing, rales and rhonchi  Gastrointestinal: soft, non-tender abdomen, no pulsatile mass, normal bowl sounds. L CVAT  Musculoskeletal: supple neck, no lower extremity edema, no midline tenderness  Integumentary: warm, dry, no rash  Neurologic: awake, alert, cranial nerves II-XII grossly intact, extremities’ motor and sensory functions grossly intact  Psychiatric: appropriate mood, appropriate affect

## 2021-07-05 NOTE — ED PROVIDER NOTE - PATIENT PORTAL LINK FT
You can access the FollowMyHealth Patient Portal offered by [facility] by registering at the following website: [email or web address]. By joining [facility]’s FollowMyHealth portal, you will also be able to view your health information using other applications (apps) compatible with our system.

## 2021-07-05 NOTE — ED PROVIDER NOTE - NSFOLLOWUPINSTRUCTIONS_ED_ALL_ED_FT
Follow up with your PMD in 1-2 days    Pyelonephritis    Pyelonephritis is a kidney infection. The kidneys are the organs that filter a person's blood and move waste out of the bloodstream and into the urine. Urine passes from the kidneys, through the ureters, and into the bladder. In most cases, the infection clears up with treatment and does not cause further problems. More severe infections or chronic infections can sometimes spread to the bloodstream or lead to other problems with the kidneys. Symptoms include frequent or painful urination, abdominal pain, back pain, flank pain, fever/chills, nausea, or vomiting. If you were prescribed an antibiotic medicine, take it as told by your health care provider. Do not stop taking the antibiotic even if you start to feel better.    SEEK IMMEDIATE MEDICAL CARE IF YOU HAVE THE FOLLOWING SYMPTOMS: inability to hold down antibiotics or fluids, worsening pain, dizziness/lightheadedness, or change in mental status.

## 2021-07-05 NOTE — ED ADULT NURSE NOTE - OBJECTIVE STATEMENT
Pt with complaints of generalized body pain and fever x 3 days with vomiting and right back pain. Seen in [facility] yesterday. No abdominal pain, no UTI symptoms

## 2021-07-05 NOTE — ED PROVIDER NOTE - NS ED ROS FT
Constitutional: (-) fever  Eyes/ENT: (-) blurry vision, (-) epistaxis  Cardiovascular: (-) chest pain, (-) syncope  Respiratory: (-) cough, (-) shortness of breath  Gastrointestinal: (+) vomiting, (-) diarrhea (+) nausea  Musculoskeletal: (-) neck pain, (-) back pain, (-) joint pain  Integumentary: (-) rash, (-) edema  Neurological: (-) headache, (-) altered mental status  Psychiatric: (-) hallucinations  Allergic/Immunologic: (-) pruritus

## 2021-07-05 NOTE — ED ADULT NURSE NOTE - CHIEF COMPLAINT QUOTE
Patient reports feeling nausea, vomiting and body aches x3days. Patient was seen at [facility] and WBC are at 12.8 rapid COVID swab negative.

## 2021-07-05 NOTE — ED PROVIDER NOTE - ATTENDING CONTRIBUTION TO CARE
25 yr old f w/ no pmh who presents with fevers and general myalgias. Pt states that for the past three days she has been having fevers, bodyaches. Pt went to urgent care today and had a negative rapid covid. pt also states that she was told that her wbc was elevated and referred to the ED for evaluation. pt is not vaccinated, however, pt denies any recent exposures to sick individuals. Pt denies any cough, urinary symptoms, vaginal discharge, abd pain, or any other complaints.     VITAL SIGNS: I have reviewed nursing notes and confirm.  CONSTITUTIONAL: non-toxic, well appearing  SKIN: no rash, no petechiae.  EYES: PERRL, EOMI, pink conjunctiva, anicteric  ENT: tongue midline, no exudates, MMM  NECK: Supple; no meningismus, no JVD  CARD: RRR, no murmurs, equal radial pulses bilaterally 2+  RESP: CTAB, no respiratory distress  ABD: Soft, non-tender, non-distended, no peritoneal signs, no HSM, L CVA tenderness  EXT: Normal ROM x4. No edema. No calves tenderness  NEURO: Alert, oriented. CN2-12 intact, equal strength bilaterally, nl gait.  PSYCH: Cooperative, appropriate.    a/p  25 yr old f that presents with fever and general myalgias  -labs  -imaging  -ua  -ivf  -dispo pending

## 2021-07-05 NOTE — ED PROVIDER NOTE - OBJECTIVE STATEMENT
Pt is 25 year old female with no PMH presents to ED with complaints of Nausea and vomiting. Pt states for past 1-2 days as been having lower back pain and body aches. Pt states today developed nausea and vomiting, NBNB. Pt states feels similar to last time had pyelo. Pt also attests to fevers at home of 10 F. pain is mild, non radiating with no alleviating or aggravating factors. Denies chest pain, sob, abdominal pain, constipation or diarrhea

## 2021-07-06 LAB
CULTURE RESULTS: SIGNIFICANT CHANGE UP
SPECIMEN SOURCE: SIGNIFICANT CHANGE UP

## 2021-10-27 ENCOUNTER — APPOINTMENT (OUTPATIENT)
Dept: PULMONOLOGY | Facility: CLINIC | Age: 26
End: 2021-10-27

## 2023-07-31 ENCOUNTER — CONSULTATION/EVALUATION (OUTPATIENT)
Dept: URBAN - METROPOLITAN AREA CLINIC 14 | Facility: CLINIC | Age: 28
End: 2023-07-31

## 2023-07-31 DIAGNOSIS — H52.7: ICD-10-CM

## 2023-07-31 PROCEDURE — 99499LK REFRACTIVE CONSULT/LASIK

## 2023-07-31 ASSESSMENT — VISUAL ACUITY
OD_SC: 20/200
OD_BCVA: 20/20
OS_SC: 20/200
OS_BCVA: 20/20

## 2023-07-31 ASSESSMENT — KERATOMETRY
OS_K2POWER_DIOPTERS: 44.75
OS_AXISANGLE_DEGREES: 1
OD_K1POWER_DIOPTERS: 43.75
OS_AXISANGLE2_DEGREES: 91
OD_AXISANGLE2_DEGREES: 95
OD_AXISANGLE_DEGREES: 5
OS_K1POWER_DIOPTERS: 44.00
OD_K2POWER_DIOPTERS: 44.50

## 2023-07-31 ASSESSMENT — TONOMETRY
OD_IOP_MMHG: 13
OS_IOP_MMHG: 14

## 2023-07-31 ASSESSMENT — PACHYMETRY
OS_CT_UM: 529
OD_CT_UM: 526

## 2023-10-12 ENCOUNTER — CLINIC PROCEDURE ONLY (OUTPATIENT)
Dept: URBAN - METROPOLITAN AREA CLINIC 14 | Facility: CLINIC | Age: 28
End: 2023-10-12

## 2023-10-12 DIAGNOSIS — H52.7: ICD-10-CM

## 2023-10-12 PROCEDURE — 66999NC LASER SUITE OFFICE PROCEDURE NO CHARGE: Mod: NC

## 2023-10-13 ENCOUNTER — POST-OP (OUTPATIENT)
Dept: URBAN - METROPOLITAN AREA CLINIC 14 | Facility: CLINIC | Age: 28
End: 2023-10-13

## 2023-10-13 DIAGNOSIS — Z98.890: ICD-10-CM

## 2023-10-13 PROCEDURE — 99024NOCM NON COMANAGED POST OP CARE

## 2023-10-13 ASSESSMENT — VISUAL ACUITY
OD_PH: 20/25
OD_SC: 20/40
OU_SC: 20/40
OS_SC: 20/50
OS_PH: 20/40

## 2023-10-19 ENCOUNTER — POST-OP (OUTPATIENT)
Dept: URBAN - METROPOLITAN AREA CLINIC 14 | Facility: CLINIC | Age: 28
End: 2023-10-19

## 2023-10-19 DIAGNOSIS — Z98.890: ICD-10-CM

## 2023-10-19 PROCEDURE — 99024LK POST OP LASIK CARE ONLY

## 2023-10-19 ASSESSMENT — VISUAL ACUITY
OD_PH: 20/20
OD_SC: 20/40
OS_SC: 20/25

## 2023-11-17 ENCOUNTER — POST-OP (OUTPATIENT)
Dept: URBAN - METROPOLITAN AREA CLINIC 14 | Facility: CLINIC | Age: 28
End: 2023-11-17

## 2023-11-17 DIAGNOSIS — Z98.890: ICD-10-CM

## 2023-11-17 PROCEDURE — 99024LK POST OP LASIK CARE ONLY

## 2023-11-17 ASSESSMENT — VISUAL ACUITY
OS_BCVA: 20/20
OD_SC: 20/20
OD_BCVA: 20/20
OU_SC: 20/15
OS_SC: 20/20

## 2024-05-22 NOTE — ED PROVIDER NOTE - CCCP TRG CHIEF CMPLNT
How Severe Is Your Condition?: moderate Body Location Override (Optional): Left cheek and left temp,e abdominal pain

## 2025-06-03 NOTE — PATIENT PROFILE ADULT - PRO INTERPRETER NEED 2
English Fellow Fellow Fellow Resident/Fellow Fellow Fellow Fellow Fellow Resident Resident Resident Resident Resident Resident Resident Resident Resident

## 2025-07-25 ENCOUNTER — COMPREHENSIVE EXAM (OUTPATIENT)
Age: 30
End: 2025-07-25

## 2025-07-25 DIAGNOSIS — H52.13: ICD-10-CM

## 2025-07-25 PROCEDURE — 92015 DETERMINE REFRACTIVE STATE: CPT

## 2025-07-25 PROCEDURE — 92014 COMPRE OPH EXAM EST PT 1/>: CPT
